# Patient Record
Sex: FEMALE | Race: WHITE | Employment: OTHER | URBAN - METROPOLITAN AREA
[De-identification: names, ages, dates, MRNs, and addresses within clinical notes are randomized per-mention and may not be internally consistent; named-entity substitution may affect disease eponyms.]

---

## 2017-12-06 ENCOUNTER — OFFICE VISIT (OUTPATIENT)
Dept: ENDOCRINOLOGY | Age: 62
End: 2017-12-06

## 2017-12-06 VITALS
DIASTOLIC BLOOD PRESSURE: 64 MMHG | SYSTOLIC BLOOD PRESSURE: 113 MMHG | WEIGHT: 199 LBS | BODY MASS INDEX: 30.16 KG/M2 | HEIGHT: 68 IN | HEART RATE: 64 BPM

## 2017-12-06 DIAGNOSIS — E55.9 VITAMIN D INSUFFICIENCY: ICD-10-CM

## 2017-12-06 DIAGNOSIS — R53.83 OTHER FATIGUE: ICD-10-CM

## 2017-12-06 DIAGNOSIS — E03.9 ACQUIRED HYPOTHYROIDISM: Primary | ICD-10-CM

## 2017-12-06 DIAGNOSIS — E03.9 ACQUIRED HYPOTHYROIDISM: ICD-10-CM

## 2017-12-06 LAB
PARATHYROID HORMONE INTACT: 67.9 PG/ML (ref 14–72)
TSH REFLEX: 0.55 UIU/ML (ref 0.27–4.2)
VITAMIN B-12: 248 PG/ML (ref 211–911)
VITAMIN D 25-HYDROXY: 25.4 NG/ML

## 2017-12-06 PROCEDURE — 1036F TOBACCO NON-USER: CPT | Performed by: INTERNAL MEDICINE

## 2017-12-06 PROCEDURE — 99204 OFFICE O/P NEW MOD 45 MIN: CPT | Performed by: INTERNAL MEDICINE

## 2017-12-06 PROCEDURE — G8484 FLU IMMUNIZE NO ADMIN: HCPCS | Performed by: INTERNAL MEDICINE

## 2017-12-06 PROCEDURE — G8427 DOCREV CUR MEDS BY ELIG CLIN: HCPCS | Performed by: INTERNAL MEDICINE

## 2017-12-06 PROCEDURE — 3017F COLORECTAL CA SCREEN DOC REV: CPT | Performed by: INTERNAL MEDICINE

## 2017-12-06 PROCEDURE — G8417 CALC BMI ABV UP PARAM F/U: HCPCS | Performed by: INTERNAL MEDICINE

## 2017-12-06 PROCEDURE — 3014F SCREEN MAMMO DOC REV: CPT | Performed by: INTERNAL MEDICINE

## 2017-12-06 RX ORDER — BUDESONIDE AND FORMOTEROL FUMARATE DIHYDRATE 160; 4.5 UG/1; UG/1
2 AEROSOL RESPIRATORY (INHALATION) PRN
Status: ON HOLD | COMMUNITY
End: 2019-05-01

## 2017-12-06 RX ORDER — CELECOXIB 200 MG/1
CAPSULE ORAL
Refills: 3 | COMMUNITY
Start: 2017-11-08

## 2017-12-06 RX ORDER — LEVOTHYROXINE SODIUM 112 UG/1
TABLET ORAL
Refills: 3 | COMMUNITY
Start: 2017-11-08

## 2017-12-06 RX ORDER — ESTRADIOL 0.5 MG/1
TABLET ORAL
Refills: 13 | COMMUNITY
Start: 2017-11-13 | End: 2021-05-23

## 2017-12-07 DIAGNOSIS — E03.9 HYPOTHYROIDISM (ACQUIRED): Primary | ICD-10-CM

## 2017-12-08 ENCOUNTER — TELEPHONE (OUTPATIENT)
Dept: ENDOCRINOLOGY | Age: 62
End: 2017-12-08

## 2017-12-08 NOTE — TELEPHONE ENCOUNTER
Spoke with pt and let her know Dr. Meghan Talbot is waiting for all results to come back to give results. Pt understood. Thanks!

## 2017-12-09 LAB — IGA: 296 MG/DL (ref 68–408)

## 2017-12-10 LAB — TISSUE TRANSGLUTAMINASE IGA: 1 U/ML (ref 0–3)

## 2017-12-11 ENCOUNTER — TELEPHONE (OUTPATIENT)
Dept: ENDOCRINOLOGY | Age: 62
End: 2017-12-11

## 2017-12-11 RX ORDER — ERGOCALCIFEROL (VITAMIN D2) 1250 MCG
50000 CAPSULE ORAL WEEKLY
Qty: 12 CAPSULE | Refills: 3 | Status: SHIPPED | OUTPATIENT
Start: 2017-12-11 | End: 2021-05-23

## 2017-12-11 NOTE — TELEPHONE ENCOUNTER
Patient just missed someone's call regarding test results. She is home now and will be the rest of the day and would like someone to call her back please.

## 2017-12-11 NOTE — TELEPHONE ENCOUNTER
Called and left message for patient to call and get lab results        Please advise her the vitamin D was low, I am restarting 50,000 IU once a week. Rest of her test were normal

## 2019-04-29 NOTE — PROGRESS NOTES
PRE OP INSTRUCTION SHEET   1. Do not eat or drink anything after 12 midnight  prior to surgery. This includes no water, chewing gum or mints. 2. Take the following pills will a small sip of water (see MAR)                                        3. Aspirin, Ibuprofen, Advil, Naproxen, Vitamin E, fish oil and other Anti-inflammatory products should be stopped for 5 days before surgery or as directed by your physician. 4. Check with your Doctor regarding stopping Plavix, Coumadin, Lovenox, Fragmin or other blood thinners   5. Do not smoke, and do not drink any alcoholic beverages 24 hours prior to surgery. This includes NA Beer. 6. You may brush your teeth and gargle the morning of surgery. DO NOT SWALLOW WATER   7. You MUST make arrangements for a responsible adult to take you home after your surgery. You will not be allowed to leave alone or drive yourself home. It is strongly suggested someone stay with you the first 24 hrs. Your surgery will be cancelled if you do not have a ride home. 8. A parent/legal guardian must accompany a child scheduled for surgery and plan to stay at the hospital until the child is discharged. Please do not bring other children with you. 9. Please wear simple, loose fitting clothing to the hospital.  Milka Ramosyle not bring valuables (money, credit cards, checkbooks, etc.) Do not wear any makeup (including no eye makeup) or nail polish on your fingers or toes. 10. DO NOT wear any jewelry or piercings on day of surgery. All body piercing jewelry must be removed. 11. If you have dentures,glasses, or contacts they will be removed before going to the OR; we will provide you a container. 12. Please see your family doctor/and cardiologist for a history & physical and/or concerning medications. Bring any test results/reports from your physician's office. Have history and labs faxed to [phone].  Remember to bring Blood Bank bracelet on the day of surgery. 14. If you have a Living Will and Durable Power of  for Healthcare, please bring in a copy. 13. Notify your Surgeon if you develop any illness between now and surgery  time, cough, cold, fever, sore throat, nausea, vomiting, etc.  Please notify your surgeon if you experience dizziness, shortness of breath or blurred vision between now & the time of your surgery   16. DO NOT shave your operative site 96 hours prior to surgery. For face & neck surgery, men may use an electric razor 48 hours prior to surgery. 17. Shower with _x__Antibacterial soap (x_chlorhexidine for total joint  Pt's) shower two times before surgery.(the morning of and the night before. 18. To provide excellent care visitors will be limited to one in the room at any given time.   Please call pre admission testing if you any further questions [phone] or [phone]

## 2019-04-30 ENCOUNTER — ANESTHESIA EVENT (OUTPATIENT)
Dept: OPERATING ROOM | Age: 64
End: 2019-04-30
Payer: MEDICARE

## 2019-05-01 ENCOUNTER — HOSPITAL ENCOUNTER (OUTPATIENT)
Age: 64
Setting detail: OUTPATIENT SURGERY
Discharge: HOME | End: 2019-05-01
Attending: UROLOGY | Admitting: UROLOGY
Payer: MEDICARE

## 2019-05-01 ENCOUNTER — ANESTHESIA (OUTPATIENT)
Dept: OPERATING ROOM | Age: 64
End: 2019-05-01
Payer: MEDICARE

## 2019-05-01 VITALS
SYSTOLIC BLOOD PRESSURE: 112 MMHG | DIASTOLIC BLOOD PRESSURE: 73 MMHG | OXYGEN SATURATION: 92 % | RESPIRATION RATE: 14 BRPM

## 2019-05-01 VITALS
TEMPERATURE: 97.3 F | OXYGEN SATURATION: 96 % | DIASTOLIC BLOOD PRESSURE: 82 MMHG | HEIGHT: 68 IN | WEIGHT: 210 LBS | SYSTOLIC BLOOD PRESSURE: 119 MMHG | BODY MASS INDEX: 31.83 KG/M2 | HEART RATE: 72 BPM | RESPIRATION RATE: 16 BRPM

## 2019-05-01 PROCEDURE — 6360000002 HC RX W HCPCS: Performed by: NURSE ANESTHETIST, CERTIFIED REGISTERED

## 2019-05-01 PROCEDURE — 2580000003 HC RX 258: Performed by: ANESTHESIOLOGY

## 2019-05-01 PROCEDURE — 7100000010 HC PHASE II RECOVERY - FIRST 15 MIN: Performed by: UROLOGY

## 2019-05-01 PROCEDURE — 2580000003 HC RX 258: Performed by: UROLOGY

## 2019-05-01 PROCEDURE — 7100000011 HC PHASE II RECOVERY - ADDTL 15 MIN: Performed by: UROLOGY

## 2019-05-01 PROCEDURE — 3600000004 HC SURGERY LEVEL 4 BASE: Performed by: UROLOGY

## 2019-05-01 PROCEDURE — 2709999900 HC NON-CHARGEABLE SUPPLY: Performed by: UROLOGY

## 2019-05-01 PROCEDURE — 6370000000 HC RX 637 (ALT 250 FOR IP): Performed by: UROLOGY

## 2019-05-01 PROCEDURE — 3700000000 HC ANESTHESIA ATTENDED CARE: Performed by: UROLOGY

## 2019-05-01 PROCEDURE — 2500000003 HC RX 250 WO HCPCS: Performed by: ANESTHESIOLOGY

## 2019-05-01 PROCEDURE — 6360000002 HC RX W HCPCS: Performed by: UROLOGY

## 2019-05-01 RX ORDER — MAGNESIUM HYDROXIDE 1200 MG/15ML
LIQUID ORAL PRN
Status: DISCONTINUED | OUTPATIENT
Start: 2019-05-01 | End: 2019-05-01 | Stop reason: ALTCHOICE

## 2019-05-01 RX ORDER — SODIUM CHLORIDE 0.9 % (FLUSH) 0.9 %
10 SYRINGE (ML) INJECTION PRN
Status: DISCONTINUED | OUTPATIENT
Start: 2019-05-01 | End: 2019-05-01 | Stop reason: HOSPADM

## 2019-05-01 RX ORDER — MEPERIDINE HYDROCHLORIDE 25 MG/ML
12.5 INJECTION INTRAMUSCULAR; INTRAVENOUS; SUBCUTANEOUS EVERY 5 MIN PRN
Status: DISCONTINUED | OUTPATIENT
Start: 2019-05-01 | End: 2019-05-01 | Stop reason: HOSPADM

## 2019-05-01 RX ORDER — FENTANYL CITRATE 50 UG/ML
INJECTION, SOLUTION INTRAMUSCULAR; INTRAVENOUS PRN
Status: DISCONTINUED | OUTPATIENT
Start: 2019-05-01 | End: 2019-05-01 | Stop reason: SDUPTHER

## 2019-05-01 RX ORDER — HYDROMORPHONE HCL 110MG/55ML
0.5 PATIENT CONTROLLED ANALGESIA SYRINGE INTRAVENOUS EVERY 5 MIN PRN
Status: DISCONTINUED | OUTPATIENT
Start: 2019-05-01 | End: 2019-05-01 | Stop reason: HOSPADM

## 2019-05-01 RX ORDER — OXYCODONE HYDROCHLORIDE AND ACETAMINOPHEN 5; 325 MG/1; MG/1
1 TABLET ORAL PRN
Status: DISCONTINUED | OUTPATIENT
Start: 2019-05-01 | End: 2019-05-01 | Stop reason: HOSPADM

## 2019-05-01 RX ORDER — PROPOFOL 10 MG/ML
INJECTION, EMULSION INTRAVENOUS PRN
Status: DISCONTINUED | OUTPATIENT
Start: 2019-05-01 | End: 2019-05-01 | Stop reason: SDUPTHER

## 2019-05-01 RX ORDER — OXYCODONE HYDROCHLORIDE AND ACETAMINOPHEN 5; 325 MG/1; MG/1
2 TABLET ORAL PRN
Status: DISCONTINUED | OUTPATIENT
Start: 2019-05-01 | End: 2019-05-01 | Stop reason: HOSPADM

## 2019-05-01 RX ORDER — SODIUM CHLORIDE, SODIUM LACTATE, POTASSIUM CHLORIDE, CALCIUM CHLORIDE 600; 310; 30; 20 MG/100ML; MG/100ML; MG/100ML; MG/100ML
INJECTION, SOLUTION INTRAVENOUS CONTINUOUS
Status: DISCONTINUED | OUTPATIENT
Start: 2019-05-01 | End: 2019-05-01 | Stop reason: HOSPADM

## 2019-05-01 RX ORDER — HYDROMORPHONE HCL 110MG/55ML
0.5 PATIENT CONTROLLED ANALGESIA SYRINGE INTRAVENOUS EVERY 10 MIN PRN
Status: DISCONTINUED | OUTPATIENT
Start: 2019-05-01 | End: 2019-05-01 | Stop reason: HOSPADM

## 2019-05-01 RX ORDER — HYDRALAZINE HYDROCHLORIDE 20 MG/ML
5 INJECTION INTRAMUSCULAR; INTRAVENOUS EVERY 30 MIN PRN
Status: DISCONTINUED | OUTPATIENT
Start: 2019-05-01 | End: 2019-05-01 | Stop reason: HOSPADM

## 2019-05-01 RX ORDER — DIPHENHYDRAMINE HYDROCHLORIDE 50 MG/ML
6.25 INJECTION INTRAMUSCULAR; INTRAVENOUS
Status: DISCONTINUED | OUTPATIENT
Start: 2019-05-01 | End: 2019-05-01 | Stop reason: HOSPADM

## 2019-05-01 RX ORDER — LIDOCAINE HYDROCHLORIDE 20 MG/ML
JELLY TOPICAL PRN
Status: DISCONTINUED | OUTPATIENT
Start: 2019-05-01 | End: 2019-05-01 | Stop reason: ALTCHOICE

## 2019-05-01 RX ORDER — ONDANSETRON 2 MG/ML
4 INJECTION INTRAMUSCULAR; INTRAVENOUS EVERY 30 MIN PRN
Status: DISCONTINUED | OUTPATIENT
Start: 2019-05-01 | End: 2019-05-01 | Stop reason: HOSPADM

## 2019-05-01 RX ORDER — SODIUM CHLORIDE 0.9 % (FLUSH) 0.9 %
10 SYRINGE (ML) INJECTION EVERY 12 HOURS SCHEDULED
Status: DISCONTINUED | OUTPATIENT
Start: 2019-05-01 | End: 2019-05-01 | Stop reason: HOSPADM

## 2019-05-01 RX ORDER — GENTAMICIN SULFATE 80 MG/100ML
80 INJECTION, SOLUTION INTRAVENOUS ONCE
Status: COMPLETED | OUTPATIENT
Start: 2019-05-01 | End: 2019-05-01

## 2019-05-01 RX ORDER — CIPROFLOXACIN 250 MG/1
250 TABLET, FILM COATED ORAL 2 TIMES DAILY
Qty: 10 TABLET | Refills: 0 | Status: SHIPPED | OUTPATIENT
Start: 2019-05-01 | End: 2019-05-06

## 2019-05-01 RX ORDER — PHENAZOPYRIDINE HYDROCHLORIDE 200 MG/1
200 TABLET, FILM COATED ORAL 3 TIMES DAILY PRN
Qty: 15 TABLET | Refills: 0 | Status: SHIPPED | OUTPATIENT
Start: 2019-05-01 | End: 2019-05-06

## 2019-05-01 RX ORDER — LIDOCAINE HYDROCHLORIDE 10 MG/ML
1 INJECTION, SOLUTION EPIDURAL; INFILTRATION; INTRACAUDAL; PERINEURAL
Status: COMPLETED | OUTPATIENT
Start: 2019-05-01 | End: 2019-05-01

## 2019-05-01 RX ORDER — LABETALOL HYDROCHLORIDE 5 MG/ML
5 INJECTION, SOLUTION INTRAVENOUS
Status: DISCONTINUED | OUTPATIENT
Start: 2019-05-01 | End: 2019-05-01 | Stop reason: HOSPADM

## 2019-05-01 RX ADMIN — PROPOFOL 100 MG: 10 INJECTION, EMULSION INTRAVENOUS at 15:03

## 2019-05-01 RX ADMIN — SODIUM CHLORIDE, POTASSIUM CHLORIDE, SODIUM LACTATE AND CALCIUM CHLORIDE: 600; 310; 30; 20 INJECTION, SOLUTION INTRAVENOUS at 14:32

## 2019-05-01 RX ADMIN — LIDOCAINE HYDROCHLORIDE 1 ML: 10 INJECTION, SOLUTION EPIDURAL; INFILTRATION; INTRACAUDAL; PERINEURAL at 14:32

## 2019-05-01 RX ADMIN — GENTAMICIN SULFATE 80 MG: 80 INJECTION, SOLUTION INTRAVENOUS at 14:34

## 2019-05-01 RX ADMIN — FENTANYL CITRATE 100 MCG: 50 INJECTION INTRAMUSCULAR; INTRAVENOUS at 15:02

## 2019-05-01 ASSESSMENT — PULMONARY FUNCTION TESTS
PIF_VALUE: 0

## 2019-05-01 ASSESSMENT — PAIN - FUNCTIONAL ASSESSMENT: PAIN_FUNCTIONAL_ASSESSMENT: 0-10

## 2019-05-01 ASSESSMENT — PAIN SCALES - GENERAL
PAINLEVEL_OUTOF10: 0
PAINLEVEL_OUTOF10: 0

## 2019-05-01 NOTE — ANESTHESIA PRE PROCEDURE
Department of Anesthesiology  Preprocedure Note       Name:  Susannah Romero   Age:  75 y.o.  :  1955                                          MRN:  [id number]         Date:  2019      Surgeon: Louis Prescott):  Earline King MD    Procedure: CYSTOSCOPY, POSSIBLE BLADDER BIOPSY, POSSIBLE URETHRAL DILATATION (N/A Bladder)    Medications prior to admission:   Prior to Admission medications    Medication Sig Start Date End Date Taking?  Authorizing Provider   ergocalciferol (DRISDOL) 58722 units capsule Take 1 capsule by mouth once a week 17 Yes Brett Murguia MD   celecoxib (CELEBREX) 200 MG capsule  17  Yes Historical Provider, MD   levothyroxine (SYNTHROID) 112 MCG tablet  17  Yes Historical Provider, MD   estradiol (ESTRACE) 0.5 MG tablet  17  Yes Historical Provider, MD   budesonide-formoterol (SYMBICORT) 160-4.5 MCG/ACT AERO Inhale 2 puffs into the lungs as needed    Yes Historical Provider, MD       Current medications:    Current Facility-Administered Medications   Medication Dose Route Frequency Provider Last Rate Last Dose    gentamicin (GARAMYCIN) IVPB 80 mg  80 mg Intravenous Once Earline King MD        lactated ringers infusion   Intravenous Continuous Alfa Lockett MD        sodium chloride flush 0.9 % injection 10 mL  10 mL Intravenous 2 times per day Alfa Lockett MD        sodium chloride flush 0.9 % injection 10 mL  10 mL Intravenous PRN Alfa Lockett MD        lidocaine PF 1 % injection 1 mL  1 mL Intradermal Once PRN Alfa Lockett MD        HYDROmorphone (DILAUDID) injection 0.5 mg  0.5 mg Intravenous Q10 Min PRN Karie Schlatter, MD        HYDROmorphone (DILAUDID) injection 0.5 mg  0.5 mg Intravenous Q5 Min PRN Karie Schlatter, MD        oxyCODONE-acetaminophen (PERCOCET) 5-325 MG per tablet 1 tablet  1 tablet Oral PRN Karie Schlatter, MD        Or    oxyCODONE-acetaminophen (PERCOCET) 5-325 MG per tablet 2 tablet  2 tablet Oral PRN Dion Quezada MD        diphenhydrAMINE (BENADRYL) injection 6.25 mg  6.25 mg Intravenous Once PRN Dion Quezada MD        ondansetron [facility]) injection 4 mg  4 mg Intravenous Q30 Min PRN Dion Quezada MD        labetalol (NORMODYNE;TRANDATE) injection 5 mg  5 mg Intravenous Q15 Min PRN Dion Quezada MD        hydrALAZINE (APRESOLINE) injection 5 mg  5 mg Intravenous Q30 Min PRN Dion Quezada MD        meperidine (DEMEROL) injection 12.5 mg  12.5 mg Intravenous Q5 Min PRN Dion Quezada MD           Allergies: Allergies   Allergen Reactions    Keflex [Cephalexin]     Sulfa Antibiotics Hives    Percocet [Oxycodone-Acetaminophen] Nausea And Vomiting     Flu symptoms        Problem List:  There is no problem list on file for this patient. Past Medical History:        Diagnosis Date    Abnormal vision     Arthritis     Asthma     Back ache     Circulation problem     Feet       Past Surgical History:        Procedure Laterality Date    ABDOMINAL WALL SURGERY      EYE SURGERY      FOOT SURGERY      HYSTERECTOMY         Social History:    Social History     Tobacco Use    Smoking status: Never Smoker    Smokeless tobacco: Never Used   Substance Use Topics    Alcohol use: No                                Counseling given: Not Answered      Vital Signs (Current):   Vitals:    04/29/19 1438   Weight: 210 lb (95.3 kg)   Height: 5' 8\" (1.727 m)                                              BP Readings from Last 3 Encounters:   12/06/17 113/64       NPO Status:                                                                                 BMI:   Wt Readings from Last 3 Encounters:   04/29/19 210 lb (95.3 kg)   12/06/17 199 lb (90.3 kg)     Body mass index is 31.93 kg/m².     CBC: No results found for: WBC, RBC, HGB, HCT, MCV, RDW, PLT    CMP: No results found for: NA, K, CL, CO2, BUN, CREATININE, GFRAA, AGRATIO, LABGLOM, GLUCOSE, PROT, CALCIUM, BILITOT, ALKPHOS, AST, ALT    POC Tests: No results for input(s): POCGLU, POCNA, POCK, POCCL, POCBUN, POCHEMO, POCHCT in the last 72 hours. Coags: No results found for: PROTIME, INR, APTT    HCG (If Applicable): No results found for: PREGTESTUR, PREGSERUM, HCG, HCGQUANT     ABGs: No results found for: PHART, PO2ART, XYR9GPM, BAS8IPN, BEART, R3CKLHQV     Type & Screen (If Applicable):  No results found for: LABABO, [address]    Anesthesia Evaluation  Patient summary reviewed and Nursing notes reviewed no history of anesthetic complications:   Airway: Mallampati: II     Neck ROM: full   Dental:          Pulmonary:   (+) asthma:                            Cardiovascular:                      Neuro/Psych:               GI/Hepatic/Renal:            ROS comment: obesity. Endo/Other:                     Abdominal:           Vascular:                                        Anesthesia Plan      general     ASA 2       Induction: intravenous. Anesthetic plan and risks discussed with patient. Plan discussed with CRNA.                   Mitchell Reed MD   5/1/2019

## 2019-05-01 NOTE — PROGRESS NOTES
Report from [address] [place] [address] Rd and CRNA. Pt arrived to postop from OR. See doc flow for vitals and assessment. Will monitor.

## 2019-05-02 NOTE — OP NOTE
[address]                 [address] [address]                                OPERATIVE REPORT    PATIENT NAME: Maria C Hill                      :        1955  MED REC NO:   [id number]                          ROOM:  ACCOUNT NO:   [de-identified]                           ADMIT DATE: 2019  PROVIDER:     Berny Escobar MD    DATE OF PROCEDURE:  2019    PREOPERATIVE DIAGNOSES:  1. Outlet obstructive symptoms. 2.  Chronic cystitis. 3.  Mixed incontinence. POSTOPERATIVE DIAGNOSES:  1. Outlet obstructive symptoms. 2.  Chronic cystitis. 3.  Mixed incontinence. OPERATION PERFORMED:  Cystoscopy with urethral dilation. PRIMARY SURGEON:  Berny Escobar MD    ANESTHESIA:  General.    OPERATIVE FINDINGS:  Urethral stenosis. HISTORY:  This is a 41-year-old white female with a history of  persistent microscopic hematuria as well as some voiding difficulty. To  rule out bladder pathology, she was scheduled for a cystourethroscopy. Risks, benefits, and expected outcomes have been discussed. DETAILS OF THE PROCEDURE:  After obtaining informed consent, the patient  was taken to the operative suite. She was given a general anesthetic  and placed on table in a modified dorsal lithotomy position. Prepping  and draping was done in sterile fashion. Cystourethroscopy was then  performed with both 30 and 70-degree lenses. Panendoscopy was done  showing no evidence of any bladder cancer, tumors, or stones. Both  ureteral orifices were orthotopic with clear efflux. Mild trabeculation  was seen as well. There was urethral stenosis and outlet obstruction. Dilation was then done with Sharlon Moroccan sounds to [address]. She tolerated  the procedure well. After draining her bladder, lidocaine jelly was  applied and she was taken back to the postop recovery room in stable  condition.         Andrzej Garcia MD    D: 2019 9:37:06       T: 05/02/2019 14:09:00     [email or web address]  Job#: [id number]     Doc#: [id number]    CC:

## 2019-05-03 NOTE — ANESTHESIA POSTPROCEDURE EVALUATION
Department of Anesthesiology  Postprocedure Note    Patient: Selam Villarreal  MRN: [id number]  YOB: 1955  Date of evaluation: 5/3/2019  Time:  9:12 AM     Procedure Summary     Date:  05/01/19 Room / Location:  [place] [address] / [facility] OR    Anesthesia Start:  3649 Anesthesia Stop:  3242    Procedure:  CYSTOSCOPY, URETHRAL DILATATION (N/A Bladder) Diagnosis:  (MICROSCOPIC HEMATURIA)    Surgeon:  Alycia Tsai MD Responsible Provider:  Abbie Kamara MD    Anesthesia Type:  general ASA Status:  2          Anesthesia Type: general    Reyes Phase I: Reyes Score: 10    Reyes Phase II: Reyes Score: 10    Last vitals: Reviewed and per EMR flowsheets. Anesthesia Post Evaluation    Comments: Postoperative Anesthesia Note    Name:    Selam Villarreal  MRN:      [id number]    Empty flowsheet group. LABS:    CBC  No results found for: WBC, HGB, HCT, PLT  RENAL  No results found for: NA, K, CL, CO2, BUN, CREATININE, GLUCOSE  COAGS  No results found for: PROTIME, INR, APTT    Intake & Output:  No intake/output data recorded. Nausea & Vomiting:  No    Level of Consciousness:  Awake    Pain Assessment:  Adequate analgesia    Anesthesia Complications:  No apparent anesthetic complications    SUMMARY      Vital signs stable  OK to discharge from Stage I post anesthesia care.   Care transferred from Anesthesiology department on discharge from perioperative area

## 2019-09-25 ENCOUNTER — HOSPITAL ENCOUNTER (OUTPATIENT)
Dept: ULTRASOUND IMAGING | Age: 64
Discharge: HOME | End: 2019-09-25
Payer: MEDICARE

## 2019-09-25 DIAGNOSIS — N39.0 URINARY TRACT INFECTION WITHOUT HEMATURIA, SITE UNSPECIFIED: ICD-10-CM

## 2019-09-25 DIAGNOSIS — R39.14 FEELING OF INCOMPLETE BLADDER EMPTYING: ICD-10-CM

## 2019-09-25 PROCEDURE — 76770 US EXAM ABDO BACK WALL COMP: CPT

## 2021-05-23 ENCOUNTER — APPOINTMENT (OUTPATIENT)
Dept: CT IMAGING | Age: 66
DRG: 694 | End: 2021-05-23
Payer: MEDICARE

## 2021-05-23 ENCOUNTER — HOSPITAL ENCOUNTER (INPATIENT)
Age: 66
LOS: 1 days | Discharge: HOME | DRG: 694 | End: 2021-05-24
Attending: STUDENT IN AN ORGANIZED HEALTH CARE EDUCATION/TRAINING PROGRAM | Admitting: INTERNAL MEDICINE
Payer: MEDICARE

## 2021-05-23 DIAGNOSIS — R10.9 RIGHT FLANK PAIN: Primary | ICD-10-CM

## 2021-05-23 DIAGNOSIS — N13.30 HYDRONEPHROSIS, UNSPECIFIED HYDRONEPHROSIS TYPE: ICD-10-CM

## 2021-05-23 DIAGNOSIS — N20.0 KIDNEY STONE: ICD-10-CM

## 2021-05-23 LAB
A/G RATIO: 1.2 (ref 1.1–2.2)
ALBUMIN SERPL-MCNC: 3.9 G/DL (ref 3.4–5)
ALP BLD-CCNC: 107 U/L (ref 40–129)
ALT SERPL-CCNC: 25 U/L (ref 10–40)
ANION GAP SERPL CALCULATED.3IONS-SCNC: 12 MMOL/L (ref 3–16)
AST SERPL-CCNC: 28 U/L (ref 15–37)
BASOPHILS ABSOLUTE: 0.1 K/UL (ref 0–0.2)
BASOPHILS RELATIVE PERCENT: 1.6 %
BILIRUB SERPL-MCNC: 0.7 MG/DL (ref 0–1)
BILIRUBIN URINE: NEGATIVE
BLOOD, URINE: ABNORMAL
BUN BLDV-MCNC: 15 MG/DL (ref 7–20)
CALCIUM SERPL-MCNC: 9.6 MG/DL (ref 8.3–10.6)
CHLORIDE BLD-SCNC: 107 MMOL/L (ref 99–110)
CLARITY: CLEAR
CO2: 22 MMOL/L (ref 21–32)
COLOR: YELLOW
CREAT SERPL-MCNC: 1 MG/DL (ref 0.6–1.2)
EOSINOPHILS ABSOLUTE: 0.2 K/UL (ref 0–0.6)
EOSINOPHILS RELATIVE PERCENT: 2.9 %
EPITHELIAL CELLS, UA: ABNORMAL /HPF (ref 0–5)
GFR AFRICAN AMERICAN: >60
GFR NON-AFRICAN AMERICAN: 55
GLOBULIN: 3.2 G/DL
GLUCOSE BLD-MCNC: 139 MG/DL (ref 70–99)
GLUCOSE URINE: NEGATIVE MG/DL
HCT VFR BLD CALC: 46.8 % (ref 36–48)
HEMOGLOBIN: 16 G/DL (ref 12–16)
INFLUENZA A: NOT DETECTED
INFLUENZA B: NOT DETECTED
KETONES, URINE: NEGATIVE MG/DL
LACTIC ACID, SEPSIS: 0.7 MMOL/L (ref 0.4–1.9)
LACTIC ACID, SEPSIS: 2.2 MMOL/L (ref 0.4–1.9)
LEUKOCYTE ESTERASE, URINE: NEGATIVE
LYMPHOCYTES ABSOLUTE: 2.3 K/UL (ref 1–5.1)
LYMPHOCYTES RELATIVE PERCENT: 30.5 %
MCH RBC QN AUTO: 31.4 PG (ref 26–34)
MCHC RBC AUTO-ENTMCNC: 34.2 G/DL (ref 31–36)
MCV RBC AUTO: 91.8 FL (ref 80–100)
MICROSCOPIC EXAMINATION: YES
MONOCYTES ABSOLUTE: 0.7 K/UL (ref 0–1.3)
MONOCYTES RELATIVE PERCENT: 9.1 %
MUCUS: ABNORMAL /LPF
NEUTROPHILS ABSOLUTE: 4.3 K/UL (ref 1.7–7.7)
NEUTROPHILS RELATIVE PERCENT: 55.9 %
NITRITE, URINE: NEGATIVE
PDW BLD-RTO: 13.2 % (ref 12.4–15.4)
PH UA: 6 (ref 5–8)
PLATELET # BLD: 307 K/UL (ref 135–450)
PMV BLD AUTO: 9.1 FL (ref 5–10.5)
POTASSIUM REFLEX MAGNESIUM: 4.3 MMOL/L (ref 3.5–5.1)
PROTEIN UA: NEGATIVE MG/DL
RBC # BLD: 5.1 M/UL (ref 4–5.2)
RBC UA: ABNORMAL /HPF (ref 0–4)
SARS-COV-2 RNA, RT PCR: NOT DETECTED
SODIUM BLD-SCNC: 141 MMOL/L (ref 136–145)
SPECIFIC GRAVITY UA: 1.02 (ref 1–1.03)
TOTAL PROTEIN: 7.1 G/DL (ref 6.4–8.2)
URINE TYPE: ABNORMAL
UROBILINOGEN, URINE: 0.2 E.U./DL
WBC # BLD: 7.7 K/UL (ref 4–11)
WBC UA: ABNORMAL /HPF (ref 0–5)

## 2021-05-23 PROCEDURE — 2580000003 HC RX 258: Performed by: STUDENT IN AN ORGANIZED HEALTH CARE EDUCATION/TRAINING PROGRAM

## 2021-05-23 PROCEDURE — 93005 ELECTROCARDIOGRAM TRACING: CPT | Performed by: INTERNAL MEDICINE

## 2021-05-23 PROCEDURE — 96376 TX/PRO/DX INJ SAME DRUG ADON: CPT

## 2021-05-23 PROCEDURE — 83605 ASSAY OF LACTIC ACID: CPT

## 2021-05-23 PROCEDURE — 87086 URINE CULTURE/COLONY COUNT: CPT

## 2021-05-23 PROCEDURE — 81001 URINALYSIS AUTO W/SCOPE: CPT

## 2021-05-23 PROCEDURE — 80053 COMPREHEN METABOLIC PANEL: CPT

## 2021-05-23 PROCEDURE — 96372 THER/PROPH/DIAG INJ SC/IM: CPT

## 2021-05-23 PROCEDURE — 6360000002 HC RX W HCPCS: Performed by: STUDENT IN AN ORGANIZED HEALTH CARE EDUCATION/TRAINING PROGRAM

## 2021-05-23 PROCEDURE — 96374 THER/PROPH/DIAG INJ IV PUSH: CPT

## 2021-05-23 PROCEDURE — 85025 COMPLETE CBC W/AUTO DIFF WBC: CPT

## 2021-05-23 PROCEDURE — 1200000000 HC SEMI PRIVATE

## 2021-05-23 PROCEDURE — 99222 1ST HOSP IP/OBS MODERATE 55: CPT | Performed by: INTERNAL MEDICINE

## 2021-05-23 PROCEDURE — 87636 SARSCOV2 & INF A&B AMP PRB: CPT

## 2021-05-23 PROCEDURE — 2580000003 HC RX 258: Performed by: PHYSICIAN ASSISTANT

## 2021-05-23 PROCEDURE — 6360000002 HC RX W HCPCS: Performed by: PHYSICIAN ASSISTANT

## 2021-05-23 PROCEDURE — 74176 CT ABD & PELVIS W/O CONTRAST: CPT

## 2021-05-23 PROCEDURE — 96375 TX/PRO/DX INJ NEW DRUG ADDON: CPT

## 2021-05-23 PROCEDURE — 99284 EMERGENCY DEPT VISIT MOD MDM: CPT

## 2021-05-23 RX ORDER — PROMETHAZINE HYDROCHLORIDE 25 MG/ML
25 INJECTION, SOLUTION INTRAMUSCULAR; INTRAVENOUS ONCE
Status: COMPLETED | OUTPATIENT
Start: 2021-05-23 | End: 2021-05-23

## 2021-05-23 RX ORDER — CIPROFLOXACIN 2 MG/ML
400 INJECTION, SOLUTION INTRAVENOUS EVERY 12 HOURS
Status: DISCONTINUED | OUTPATIENT
Start: 2021-05-24 | End: 2021-05-24 | Stop reason: HOSPADM

## 2021-05-23 RX ORDER — SODIUM CHLORIDE 0.9 % (FLUSH) 0.9 %
5-40 SYRINGE (ML) INJECTION EVERY 12 HOURS SCHEDULED
Status: DISCONTINUED | OUTPATIENT
Start: 2021-05-23 | End: 2021-05-24 | Stop reason: HOSPADM

## 2021-05-23 RX ORDER — MORPHINE SULFATE 4 MG/ML
4 INJECTION, SOLUTION INTRAMUSCULAR; INTRAVENOUS ONCE
Status: COMPLETED | OUTPATIENT
Start: 2021-05-23 | End: 2021-05-23

## 2021-05-23 RX ORDER — HYDROCODONE BITARTRATE AND ACETAMINOPHEN 5; 325 MG/1; MG/1
2 TABLET ORAL EVERY 4 HOURS PRN
Status: DISCONTINUED | OUTPATIENT
Start: 2021-05-23 | End: 2021-05-24 | Stop reason: HOSPADM

## 2021-05-23 RX ORDER — SODIUM CHLORIDE 0.9 % (FLUSH) 0.9 %
5-40 SYRINGE (ML) INJECTION PRN
Status: DISCONTINUED | OUTPATIENT
Start: 2021-05-23 | End: 2021-05-24 | Stop reason: HOSPADM

## 2021-05-23 RX ORDER — NITROFURANTOIN 25; 75 MG/1; MG/1
50 CAPSULE ORAL DAILY
Status: ON HOLD | COMMUNITY
End: 2021-05-24 | Stop reason: SDUPTHER

## 2021-05-23 RX ORDER — MORPHINE SULFATE 2 MG/ML
2 INJECTION, SOLUTION INTRAMUSCULAR; INTRAVENOUS
Status: DISCONTINUED | OUTPATIENT
Start: 2021-05-23 | End: 2021-05-24 | Stop reason: HOSPADM

## 2021-05-23 RX ORDER — ONDANSETRON 2 MG/ML
4 INJECTION INTRAMUSCULAR; INTRAVENOUS EVERY 6 HOURS PRN
Status: DISCONTINUED | OUTPATIENT
Start: 2021-05-23 | End: 2021-05-23

## 2021-05-23 RX ORDER — PROMETHAZINE HYDROCHLORIDE 25 MG/1
12.5 TABLET ORAL EVERY 6 HOURS PRN
Status: DISCONTINUED | OUTPATIENT
Start: 2021-05-23 | End: 2021-05-24 | Stop reason: HOSPADM

## 2021-05-23 RX ORDER — POLYETHYLENE GLYCOL 3350 17 G/17G
17 POWDER, FOR SOLUTION ORAL DAILY PRN
Status: DISCONTINUED | OUTPATIENT
Start: 2021-05-23 | End: 2021-05-24 | Stop reason: HOSPADM

## 2021-05-23 RX ORDER — ONDANSETRON 2 MG/ML
4 INJECTION INTRAMUSCULAR; INTRAVENOUS EVERY 6 HOURS PRN
Status: DISCONTINUED | OUTPATIENT
Start: 2021-05-23 | End: 2021-05-24 | Stop reason: HOSPADM

## 2021-05-23 RX ORDER — MORPHINE SULFATE 4 MG/ML
4 INJECTION, SOLUTION INTRAMUSCULAR; INTRAVENOUS
Status: DISCONTINUED | OUTPATIENT
Start: 2021-05-23 | End: 2021-05-24 | Stop reason: HOSPADM

## 2021-05-23 RX ORDER — 0.9 % SODIUM CHLORIDE 0.9 %
1000 INTRAVENOUS SOLUTION INTRAVENOUS ONCE
Status: COMPLETED | OUTPATIENT
Start: 2021-05-23 | End: 2021-05-23

## 2021-05-23 RX ORDER — SODIUM CHLORIDE 9 MG/ML
25 INJECTION, SOLUTION INTRAVENOUS PRN
Status: DISCONTINUED | OUTPATIENT
Start: 2021-05-23 | End: 2021-05-24 | Stop reason: HOSPADM

## 2021-05-23 RX ORDER — ACETAMINOPHEN 325 MG/1
650 TABLET ORAL EVERY 4 HOURS PRN
Status: DISCONTINUED | OUTPATIENT
Start: 2021-05-23 | End: 2021-05-24 | Stop reason: HOSPADM

## 2021-05-23 RX ORDER — CIPROFLOXACIN 2 MG/ML
400 INJECTION, SOLUTION INTRAVENOUS ONCE
Status: COMPLETED | OUTPATIENT
Start: 2021-05-23 | End: 2021-05-23

## 2021-05-23 RX ORDER — KETOROLAC TROMETHAMINE 30 MG/ML
15 INJECTION, SOLUTION INTRAMUSCULAR; INTRAVENOUS ONCE
Status: COMPLETED | OUTPATIENT
Start: 2021-05-23 | End: 2021-05-23

## 2021-05-23 RX ORDER — LEVOTHYROXINE SODIUM 112 UG/1
112 TABLET ORAL DAILY
Status: DISCONTINUED | OUTPATIENT
Start: 2021-05-24 | End: 2021-05-24 | Stop reason: HOSPADM

## 2021-05-23 RX ORDER — HYDROCODONE BITARTRATE AND ACETAMINOPHEN 5; 325 MG/1; MG/1
1 TABLET ORAL EVERY 4 HOURS PRN
Status: DISCONTINUED | OUTPATIENT
Start: 2021-05-23 | End: 2021-05-24 | Stop reason: HOSPADM

## 2021-05-23 RX ORDER — SODIUM CHLORIDE 9 MG/ML
INJECTION, SOLUTION INTRAVENOUS CONTINUOUS
Status: DISCONTINUED | OUTPATIENT
Start: 2021-05-23 | End: 2021-05-24 | Stop reason: HOSPADM

## 2021-05-23 RX ORDER — ONDANSETRON 2 MG/ML
4 INJECTION INTRAMUSCULAR; INTRAVENOUS ONCE
Status: COMPLETED | OUTPATIENT
Start: 2021-05-23 | End: 2021-05-23

## 2021-05-23 RX ADMIN — HYDROMORPHONE HYDROCHLORIDE 0.5 MG: 1 INJECTION, SOLUTION INTRAMUSCULAR; INTRAVENOUS; SUBCUTANEOUS at 10:36

## 2021-05-23 RX ADMIN — CIPROFLOXACIN 400 MG: 2 INJECTION, SOLUTION INTRAVENOUS at 14:03

## 2021-05-23 RX ADMIN — SODIUM CHLORIDE 1000 ML: 9 INJECTION, SOLUTION INTRAVENOUS at 09:49

## 2021-05-23 RX ADMIN — SODIUM CHLORIDE: 9 INJECTION, SOLUTION INTRAVENOUS at 23:10

## 2021-05-23 RX ADMIN — HYDROMORPHONE HYDROCHLORIDE 0.5 MG: 1 INJECTION, SOLUTION INTRAMUSCULAR; INTRAVENOUS; SUBCUTANEOUS at 12:11

## 2021-05-23 RX ADMIN — KETOROLAC TROMETHAMINE 15 MG: 30 INJECTION, SOLUTION INTRAMUSCULAR; INTRAVENOUS at 10:03

## 2021-05-23 RX ADMIN — SODIUM CHLORIDE 1000 ML: 9 INJECTION, SOLUTION INTRAVENOUS at 12:27

## 2021-05-23 RX ADMIN — ONDANSETRON HYDROCHLORIDE 4 MG: 2 INJECTION, SOLUTION INTRAMUSCULAR; INTRAVENOUS at 12:10

## 2021-05-23 RX ADMIN — MORPHINE SULFATE 4 MG: 4 INJECTION, SOLUTION INTRAMUSCULAR; INTRAVENOUS at 09:49

## 2021-05-23 RX ADMIN — SODIUM CHLORIDE: 9 INJECTION, SOLUTION INTRAVENOUS at 16:09

## 2021-05-23 RX ADMIN — PROMETHAZINE HYDROCHLORIDE 25 MG: 25 INJECTION INTRAMUSCULAR; INTRAVENOUS at 10:36

## 2021-05-23 RX ADMIN — ONDANSETRON HYDROCHLORIDE 4 MG: 2 INJECTION, SOLUTION INTRAMUSCULAR; INTRAVENOUS at 09:48

## 2021-05-23 RX ADMIN — ENOXAPARIN SODIUM 40 MG: 40 INJECTION SUBCUTANEOUS at 17:00

## 2021-05-23 ASSESSMENT — ENCOUNTER SYMPTOMS
SHORTNESS OF BREATH: 0
NAUSEA: 1
BACK PAIN: 0
RHINORRHEA: 0
VOMITING: 1
COUGH: 0
SORE THROAT: 0
ABDOMINAL PAIN: 1

## 2021-05-23 ASSESSMENT — PAIN SCALES - GENERAL
PAINLEVEL_OUTOF10: 10

## 2021-05-23 NOTE — ED PROVIDER NOTES
[address] ED  EMERGENCY DEPARTMENT ENCOUNTER      Pt Name: Sarina Jackson  MRN: [id number]  [place] 1955  Date of evaluation: 5/23/2021  Provider: Seven White, [address] [place] [address]       Chief Complaint   Patient presents with    Flank Pain     c/o right flank pain that radiates to abdomen. Patient has hx of uti, bladder infections and kidney stones. HISTORY OF PRESENT ILLNESS   (Location/Symptom, Timing/Onset, Context/Setting, Quality, Duration, Modifying Factors, Severity)  Note limiting factors. Sarina Jackson is a 74 y.o. female who presents to the emergency department complaining of right-sided flank pain. Patient presenting with several hours of right-sided flank pain with radiation to the right lower quadrant region. History of kidney stones feels similar to patient. Pain has been waxing and waning since onset. Arrives complaining of pain to the right flank nausea and actively vomiting. States that when she got here she felt feverish, began to vomit. Currently being treated with Macrobid for history of chronic UTI receiving antibiotics for UTI prophylaxis at this time. Denies hematuria. Denies chest pain, shortness of breath. Reports history of diverticulitis however does not feel that this is similar presentation. Nursing Notes were reviewed.     PAST MEDICAL HISTORY     Past Medical History:   Diagnosis Date    Abnormal vision     Arthritis     Asthma     Back ache     Circulation problem     Feet    Thyroid disease          SURGICAL HISTORY       Past Surgical History:   Procedure Laterality Date    ABDOMINAL WALL SURGERY      CYSTOSCOPY N/A 5/1/2019    CYSTOSCOPY, URETHRAL DILATATION performed by Adi Phan MD at [address] [place] [address]      HIP SURGERY Right 08/2018    HYSTERECTOMY           CURRENT MEDICATIONS       Previous Medications    CELECOXIB (CELEBREX) 200 MG CAPSULE        LEVOTHYROXINE (SYNTHROID) 112 MCG TABLET           ALLERGIES     Keflex [cephalexin], Sulfa antibiotics, and Percocet [oxycodone-acetaminophen]    FAMILY HISTORY       Family History   Problem Relation Age of Onset    Lung Cancer Brother     Other Brother         Bowel Disease and Heart     Other Brother         Passed while sleeping     Liver Disease Brother           SOCIAL HISTORY       Social History     Socioeconomic History    Marital status:      Spouse name: None    Number of children: None    Years of education: None    Highest education level: None   Occupational History    None   Tobacco Use    Smoking status: Never Smoker    Smokeless tobacco: Never Used   Vaping Use    Vaping Use: Never assessed   Substance and Sexual Activity    Alcohol use: No    Drug use: No    Sexual activity: Never     Partners: Male     Comment: Divorce    Other Topics Concern    None   Social History Narrative    None     Social Determinants of Health     Financial Resource Strain:     Difficulty of Paying Living Expenses:    Food Insecurity:     Worried About Running Out of Food in the Last Year:     Ran Out of Food in the Last Year:    Transportation Needs:     Lack of Transportation (Medical):      Lack of Transportation (Non-Medical):    Physical Activity:     Days of Exercise per Week:     Minutes of Exercise per Session:    Stress:     Feeling of Stress :    Social Connections:     Frequency of Communication with Friends and Family:     Frequency of Social Gatherings with Friends and Family:     Attends Scientologist Services:     Active Member of Clubs or Organizations:     Attends Club or Organization Meetings:     Marital Status:    Intimate Partner Violence:     Fear of Current or Ex-Partner:     Emotionally Abused:     Physically Abused:     Sexually Abused:        SCREENINGS        James Coma Scale  Eye Opening: Spontaneous  Best Verbal Response: Oriented  Best Motor Response: Obeys commands  Parker Coma Scale Score: 15                   REVIEW OF SYSTEMS    (2-9 systems for level 4, 10 or more for level 5)   Review of Systems   Constitutional: Negative for chills and fever. HENT: Negative for congestion, rhinorrhea and sore throat. Respiratory: Negative for cough and shortness of breath. Cardiovascular: Negative for chest pain, palpitations and leg swelling. Gastrointestinal: Positive for abdominal pain, nausea and vomiting. Genitourinary: Positive for flank pain. Negative for decreased urine volume and hematuria. Musculoskeletal: Negative for back pain, neck pain and neck stiffness. Skin: Negative for rash. Allergic/Immunologic: Positive for environmental allergies. Hematological: Negative for adenopathy. Psychiatric/Behavioral: Negative for confusion. PHYSICAL EXAM    (up to 7 for level 4, 8 or more for level 5)     ED Triage Vitals   BP Temp Temp src Pulse Resp SpO2 Height Weight   -- -- -- -- -- -- -- --       Physical Exam  Constitutional:       General: She is not in acute distress. Appearance: She is not diaphoretic. HENT:      Head: Normocephalic and atraumatic. Eyes:      Pupils: Pupils are equal, round, and reactive to light. Neck:      Trachea: No tracheal deviation. Cardiovascular:      Rate and Rhythm: Normal rate and regular rhythm. Pulmonary:      Effort: Pulmonary effort is normal. No respiratory distress. Abdominal:      General: There is no distension. Palpations: Abdomen is soft. Tenderness: There is abdominal tenderness. Comments: Tenderness right flank right lower quadrant. Musculoskeletal:         General: Normal range of motion. Cervical back: Normal range of motion and neck supple. Skin:     General: Skin is warm. Neurological:      Mental Status: She is oriented to person, place, and time.          DIAGNOSTIC RESULTS     EKG: All EKG's are interpreted by the Emergency Department Physician who either signs or Co-signs this chart in the absence of a cardiologist.        RADIOLOGY:   Non-plain film images such as CT, Ultrasound and MRI are read by the radiologist. Plain radiographic images are visualized and preliminarily interpreted by the emergency physician. Interpretation per the Radiologist below, if available at the time of this note:    CT ABDOMEN PELVIS WO CONTRAST Additional Contrast? None   Final Result   Right hydronephrosis with right hydroureter and infiltration of fat   surrounding the right ureter. 1 mm punctate calcification projects in the   region of the distal right ureter. Otherwise no definite obstructing stone   identified. Although obstruction or recent passage of a stone is suspected   the infiltration of the fat surrounding the right ureter can be seen in   pyelonephritis.       Normal appearing gallbladder      Normal appearing appendix      Colonic diverticulosis without evidence for diverticulitis               LABS:  Labs Reviewed   COMPREHENSIVE METABOLIC PANEL W/ REFLEX TO MG FOR LOW K - Abnormal; Notable for the following components:       Result Value    Glucose 139 (*)     GFR Non- 55 (*)     All other components within normal limits    Narrative:     Performed at:  [facility],  [place], [facility]   Phone [phone]   LACTATE, SEPSIS - Abnormal; Notable for the following components:    Lactic Acid, Sepsis 2.2 (*)     All other components within normal limits    Narrative:     Performed at:  [facility],  [place], [facility]   Phone [phone]   URINALYSIS - Abnormal; Notable for the following components:    Blood, Urine SMALL (*)     All other components within normal limits    Narrative:     Performed at:  [facility],  [place], [facility]   Phone [phone]   MICROSCOPIC URINALYSIS - Abnormal; Notable for the following components:    Mucus, UA 3+ (*)     RBC, UA 11-20 (*)     Epithelial Cells, UA 11-20 (*)     All other components within normal limits    Narrative:     Performed at:  [facility],  [place], [facility]   Phone [phone] & INFLUENZA COMBO    Narrative:     Performed at:  [facility],  [place], [facility]   Phone [phone]   CULTURE, URINE   CBC WITH AUTO DIFFERENTIAL    Narrative:     Performed at:  [facility],  [place], [facility]   Phone [phone]   LACTATE, SEPSIS    Narrative:     Performed at:  [facility]) [facility],  [place], [facility]   Phone [phone]       All other labs were within normal range or not returned as of this dictation. EMERGENCY DEPARTMENT COURSE and DIFFERENTIAL DIAGNOSIS/MDM:   Micah Bernardo is a 74 y.o. female who presents to the emergency department with the complaint of sided flank pain radiation to right lower quadrant. Does have tenderness to palpation right lower quadrant on exam.  History of kidney stones feels similar obtain CT without contrast with concern of right flank pain being related to kidney stone type pain also evaluate for appendix. Will check basic labs, lactic acid based on age and symptoms. Will treat IV fluids, antiemetics, pain control. Patient difficult to control symptoms initially requiring multiple doses of pain control and antiemetics    Her initial lactic was elevated 2.2, trended down to 0.7 after IV fluids. While urine is not consistent with UTI with negative nitrite and leuk esterase with white blood cells 0-2, patient was treated with Cipro for concerns of pyelonephritis on CT imaging until urine culture results. Patient was found to have 1 mm stone nonobstructing at this time.   Plan is to mid to hospital service for symptom control, urology consult. CRITICAL CARE TIME   Total Critical Care time was 0 minutes, excluding separately reportable procedures. There was a high probability of clinically significant/life threatening deterioration in the patient's condition which required my urgent intervention. Clinical concern   Intervention     CONSULTS:  IP CONSULT TO HOSPITALIST  IP CONSULT TO UROLOGY    PROCEDURES:  Unless otherwise noted below, none     Procedures        FINAL IMPRESSION      1. Right flank pain    2. Kidney stone    3. Hydronephrosis, unspecified hydronephrosis type          DISPOSITION/PLAN   DISPOSITION    admit    PATIENT REFERRED TO:  No follow-up provider specified. DISCHARGE MEDICATIONS:  New Prescriptions    No medications on file     Controlled Substances Monitoring:     No flowsheet data found.     (Please note that portions of this note were completed with a voice recognition program.  Efforts were made to edit the dictations but occasionally words are mis-transcribed.)    Tim Chowdhury DO (electronically signed)  Attending Emergency Physician            Tim Chowdhury DO  05/23/21 1519

## 2021-05-23 NOTE — PROGRESS NOTES
Assessment Tool (BMAT):     Assessment Level 1- Sit and Shake    1. From a semi-reclined position, ask patient to sit up and rotate to a seated position at the side of the bed. Can use the bedrail. 2. Ask patient to reach out and grab your hand and shake making sure patient reaches across his/her midline. Pass- Patient is able to come to a seated position, maintain core strength. Maintains seated balance while reaching across midline. Move on to Assessment Level 2. Assessment Level 2- Stretch and Point   1. With patient in seated position at the side of the bed, have patient place both feet on the floor (or stool) with knees no higher than hips. 2. Ask patient to stretch one leg and straighten the knee, then bend the ankle/flex and point the toes. If appropriate, repeat with the other leg. Pass- Patient is able to demonstrate appropriate quad strength on intended weight bearing limb(s). Move onto Assessment Level 3. Assessment Level 3- Stand   1. Ask patient to elevate off the bed or chair (seated to standing) using an assistive device (cane, bedrail). 2. Patient should be able to raise buttocks off be and hold for a count of five. May repeat once. Pass- Patient maintains standing stability for at least 5 seconds, proceed to assessment level 4. Assessment Level 4- Walk   1. Ask patient to march in place at bedside. 2. Then ask patient to advance step and return each foot. Some medical conditions may render a patient from stepping backwards, use your best clinical judgement. Pass- Patient demonstrates balance while shifting weight and ability to step, takes independent steps, does not use assistive device patient is MOBILITY LEVEL 4. Mobility Level- 4  Independent selfer that is able to follow commands,.

## 2021-05-23 NOTE — ED NOTES
Report given to James GUTIERREZ at bedside. Patient transported to floor via wheelchair with belongings.      Rita Remy RN  05/23/21 0988

## 2021-05-23 NOTE — ED NOTES
Perfect serve message to Dr. [place] Blanchard [place] [facility] [facility] [address]  05/23/21 2923    Jenette Phalen returned the call to Dr Linnea Parsons  05/23/21

## 2021-05-23 NOTE — PROGRESS NOTES
/62   Pulse 71   Temp 97 °F (36.1 °C) (Oral)   Resp 18   Ht 5' 8\" (1.727 m)   Wt 220 lb (99.8 kg)   SpO2 92%   BMI 33.45 kg/m²      Assessment complete. Meds passed. Pt denies needs at this time    Pt in bed. Only complains of soreness instead of pain. Drinks provided. PCA notified of NPO after midnight status. Bedside Mobility Assessment Tool (BMAT):     Assessment Level 1- Sit and Shake    1. From a semi-reclined position, ask patient to sit up and rotate to a seated position at the side of the bed. Can use the bedrail. 2. Ask patient to reach out and grab your hand and shake making sure patient reaches across his/her midline. Pass- Patient is able to come to a seated position, maintain core strength. Maintains seated balance while reaching across midline. Move on to Assessment Level 2. Assessment Level 2- Stretch and Point   1. With patient in seated position at the side of the bed, have patient place both feet on the floor (or stool) with knees no higher than hips. 2. Ask patient to stretch one leg and straighten the knee, then bend the ankle/flex and point the toes. If appropriate, repeat with the other leg. Pass- Patient is able to demonstrate appropriate quad strength on intended weight bearing limb(s). Move onto Assessment Level 3. Assessment Level 3- Stand   1. Ask patient to elevate off the bed or chair (seated to standing) using an assistive device (cane, bedrail). 2. Patient should be able to raise buttocks off be and hold for a count of five. May repeat once. Pass- Patient maintains standing stability for at least 5 seconds, proceed to assessment level 4. Assessment Level 4- Walk   1. Ask patient to march in place at bedside. 2. Then ask patient to advance step and return each foot. Some medical conditions may render a patient from stepping backwards, use your best clinical judgement.    Pass- Patient demonstrates balance while shifting weight and ability to step, takes independent steps, does not use assistive device patient is MOBILITY LEVEL 4. Mobility Level- 4   Pt is an alert and oriented.  selfer

## 2021-05-23 NOTE — ED NOTES
Bed: 15  Expected date:   Expected time:   Means of arrival:   Comments:  Paolo Epstein 89  05/23/21 0932

## 2021-05-23 NOTE — H&P
Admission [address];  MRN: [id number] ;   Admit Date: 5/23/2021  9:32 AM   Current Date and Time: 5/23/2021 2:15 PM    PCP  --  No primary care provider on file. Chief Complaint   Patient presents with    Flank Pain     c/o right flank pain that radiates to abdomen. Patient has hx of uti, bladder infections and kidney stones. HPI:  Patient is a 74 y.o.  female  With known h.o  Hypothyroidism, recurrent UTI on macrobid  presents with increasing right flank pain for last few days  Hx of renal stones and pain feels the same with sharp pain radiating to right LQ and to groin . No fevers or chills. Has nausea but no vomiting    Pt has seen Dr. Sandy Horan in the past and had urethral dilatations   Workup showed right obstructive stone with hydronephrosis and hydroureter and admitted for eval     Allergies   Allergen Reactions    Keflex [Cephalexin]     Sulfa Antibiotics Hives    Percocet [Oxycodone-Acetaminophen] Nausea And Vomiting     Flu symptoms        Past Medical History:   Diagnosis Date    Abnormal vision     Arthritis     Asthma     Back ache     Circulation problem     Feet    Thyroid disease       Past Surgical History:   Procedure Laterality Date    ABDOMINAL WALL SURGERY      CYSTOSCOPY N/A 5/1/2019    CYSTOSCOPY, URETHRAL DILATATION performed by Cyndi Barker MD at [place] [address] Right 08/2018    HYSTERECTOMY        Not in a hospital admission.   Allergies   Allergen Reactions    Keflex [Cephalexin]     Sulfa Antibiotics Hives    Percocet [Oxycodone-Acetaminophen] Nausea And Vomiting     Flu symptoms       Social History     Tobacco Use    Smoking status: Never Smoker    Smokeless tobacco: Never Used   Substance Use Topics    Alcohol use: No      Family History   Problem Relation Age of Onset    Lung Cancer Brother     Other Brother         Bowel Disease and Heart     Other Brother         Passed while sleeping     Liver Disease Brother           Review of systems      Constitutional: Negative for fever or chills  HENT: Negative for sore throat   Eyes: Negative for redness   Respiratory: Negative  for dyspnea, cough   Cardiovascular: Negative for chest pain   Gastrointestinal:neg for abd pain, nausea or vomiting or diarrhea  No melena or BRPR  Genitourinary: Negative for hematuria + flank pain right side  Musculoskeletal: Negative for arthralgias   Skin: Negative for rash   Neurological: Negative for syncope   Hematological: Negative for adenopathy   Psychiatric/Behavorial: Negative for anxiety      Objective:     VS: Temp: 98 °F (36.7 °C)  Pulse: 73  BP: (!) 151/87  SpO2: 96 %        Physical Exam:  General:  Elderly female healthy appearing  Awake, alert and oriented. Appears to be not in any distress  Mucous Membranes:  Pink , anicteric  Neck: No JVD, no carotid bruit, no thyromegaly  Chest:  Clear to auscultation bilaterally, no added sounds  Cardiovascular:  RRR S1S2 heard, no murmurs or gallops  Abdomen:  Soft, undistended, right flank ++ tender, no organomegaly, BS present  Extremities: No edema or cyanosis.  Distal pulses well felt  Neurological : no focal deficits        LABS:  CBC:  Lab Results   Component Value Date    WBC 7.7 05/23/2021    HGB 16.0 05/23/2021    HCT 46.8 05/23/2021    MCV 91.8 05/23/2021     05/23/2021    NEUTOPHILPCT 55.9 05/23/2021    LYMPHOPCT 30.5 05/23/2021    MONOPCT 9.1 05/23/2021    BASOPCT 1.6 05/23/2021    NEUTROABS 4.3 05/23/2021    LYMPHSABS 2.3 05/23/2021    MONOSABS 0.7 05/23/2021    EOSABS 0.2 05/23/2021    BASOSABS 0.1 05/23/2021     BMP:   Lab Results   Component Value Date     05/23/2021    K 4.3 05/23/2021    CO2 22 05/23/2021    BUN 15 05/23/2021    CREATININE 1.0 05/23/2021    CALCIUM 9.6 05/23/2021     Coagulation: No results found for: INR, APTT  Cardiac markers: No results found for: CKMB, CKTOTAL, TROPONINI, MYOGLOBIN  ABGs: No results found for: POCPH, [place]    Lab Results   Component Value Date    ALT 25 05/23/2021    AST 28 05/23/2021    ALKPHOS 107 05/23/2021    BILITOT 0.7 05/23/2021         EKG: not done    Radiology:    Ct abd    Right hydronephrosis with right hydroureter and infiltration of fat   surrounding the right ureter.  1 mm punctate calcification projects in the   region of the distal right ureter.  Otherwise no definite obstructing stone   identified.  Although obstruction or recent passage of a stone is suspected   the infiltration of the fat surrounding the right ureter can be seen in   pyelonephritis. Normal appearing gallbladder     Normal appearing appendix     Colonic diverticulosis without evidence for diverticulitis       ASSESMENT & PLAN:     1. Right obstructive stone with hydronephrosis and hydroureter   - possible 1 mm stone in distal right ureter  - admit for pain control and urology eval  - start on IVF> morphine, abx       2.  Hypothyroid - on synthroid       Diet:npo   GI/DVT PX: /lovenox  CODE STATUS: full code     Jamal Clark MD, MD 5/23/2021 2:15 PM

## 2021-05-24 VITALS
WEIGHT: 220 LBS | DIASTOLIC BLOOD PRESSURE: 71 MMHG | RESPIRATION RATE: 14 BRPM | HEART RATE: 79 BPM | SYSTOLIC BLOOD PRESSURE: 116 MMHG | HEIGHT: 68 IN | BODY MASS INDEX: 33.34 KG/M2 | TEMPERATURE: 96.9 F | OXYGEN SATURATION: 94 %

## 2021-05-24 LAB
ANION GAP SERPL CALCULATED.3IONS-SCNC: 5 MMOL/L (ref 3–16)
BUN BLDV-MCNC: 14 MG/DL (ref 7–20)
CALCIUM SERPL-MCNC: 8.3 MG/DL (ref 8.3–10.6)
CHLORIDE BLD-SCNC: 114 MMOL/L (ref 99–110)
CO2: 23 MMOL/L (ref 21–32)
CREAT SERPL-MCNC: 0.9 MG/DL (ref 0.6–1.2)
EKG ATRIAL RATE: 65 BPM
EKG DIAGNOSIS: NORMAL
EKG P AXIS: 33 DEGREES
EKG P-R INTERVAL: 148 MS
EKG Q-T INTERVAL: 456 MS
EKG QRS DURATION: 72 MS
EKG QTC CALCULATION (BAZETT): 474 MS
EKG R AXIS: -4 DEGREES
EKG T AXIS: 36 DEGREES
EKG VENTRICULAR RATE: 65 BPM
GFR AFRICAN AMERICAN: >60
GFR NON-AFRICAN AMERICAN: >60
GLUCOSE BLD-MCNC: 91 MG/DL (ref 70–99)
HCT VFR BLD CALC: 38.9 % (ref 36–48)
HEMOGLOBIN: 13 G/DL (ref 12–16)
MCH RBC QN AUTO: 31.3 PG (ref 26–34)
MCHC RBC AUTO-ENTMCNC: 33.4 G/DL (ref 31–36)
MCV RBC AUTO: 93.6 FL (ref 80–100)
PDW BLD-RTO: 13.4 % (ref 12.4–15.4)
PLATELET # BLD: 218 K/UL (ref 135–450)
PMV BLD AUTO: 9.2 FL (ref 5–10.5)
POTASSIUM REFLEX MAGNESIUM: 4.2 MMOL/L (ref 3.5–5.1)
RBC # BLD: 4.15 M/UL (ref 4–5.2)
SODIUM BLD-SCNC: 142 MMOL/L (ref 136–145)
URINE CULTURE, ROUTINE: NORMAL
WBC # BLD: 6.1 K/UL (ref 4–11)

## 2021-05-24 PROCEDURE — 36415 COLL VENOUS BLD VENIPUNCTURE: CPT

## 2021-05-24 PROCEDURE — 99238 HOSP IP/OBS DSCHRG MGMT 30/<: CPT | Performed by: PHYSICIAN ASSISTANT

## 2021-05-24 PROCEDURE — 2580000003 HC RX 258: Performed by: PHYSICIAN ASSISTANT

## 2021-05-24 PROCEDURE — 93010 ELECTROCARDIOGRAM REPORT: CPT | Performed by: INTERNAL MEDICINE

## 2021-05-24 PROCEDURE — 82365 CALCULUS SPECTROSCOPY: CPT

## 2021-05-24 PROCEDURE — 88300 SURGICAL PATH GROSS: CPT

## 2021-05-24 PROCEDURE — 80048 BASIC METABOLIC PNL TOTAL CA: CPT

## 2021-05-24 PROCEDURE — 6360000002 HC RX W HCPCS: Performed by: PHYSICIAN ASSISTANT

## 2021-05-24 PROCEDURE — 85027 COMPLETE CBC AUTOMATED: CPT

## 2021-05-24 PROCEDURE — 6370000000 HC RX 637 (ALT 250 FOR IP): Performed by: PHYSICIAN ASSISTANT

## 2021-05-24 RX ORDER — HYDROCODONE BITARTRATE AND ACETAMINOPHEN 5; 325 MG/1; MG/1
1 TABLET ORAL 2 TIMES DAILY
Qty: 6 TABLET | Refills: 0 | Status: SHIPPED | OUTPATIENT
Start: 2021-05-24 | End: 2021-05-27

## 2021-05-24 RX ORDER — CIPROFLOXACIN 500 MG/1
500 TABLET, FILM COATED ORAL 2 TIMES DAILY
Qty: 10 TABLET | Refills: 0 | Status: SHIPPED | OUTPATIENT
Start: 2021-05-24 | End: 2021-05-29

## 2021-05-24 RX ORDER — NITROFURANTOIN 25; 75 MG/1; MG/1
50 CAPSULE ORAL DAILY
Qty: 30 CAPSULE | Refills: 0
Start: 2021-05-29

## 2021-05-24 RX ADMIN — Medication 10 ML: at 08:15

## 2021-05-24 RX ADMIN — SODIUM CHLORIDE: 9 INJECTION, SOLUTION INTRAVENOUS at 05:14

## 2021-05-24 RX ADMIN — LEVOTHYROXINE SODIUM 112 MCG: 112 TABLET ORAL at 05:14

## 2021-05-24 RX ADMIN — CIPROFLOXACIN 400 MG: 2 INJECTION, SOLUTION INTRAVENOUS at 02:15

## 2021-05-24 RX ADMIN — HYDROCODONE BITARTRATE AND ACETAMINOPHEN 1 TABLET: 5; 325 TABLET ORAL at 00:19

## 2021-05-24 ASSESSMENT — PAIN DESCRIPTION - LOCATION: LOCATION: GENERALIZED

## 2021-05-24 NOTE — PROGRESS NOTES
Care transferred from [facility] at this time. Pt awake denies any pain or needs.  Call light within reach

## 2021-05-24 NOTE — CARE COORDINATION
Case Management Assessment  Initial Evaluation/Discharge Summary       Patient Name: Sunshine Yan  YOB: 1955  Diagnosis: Hydronephrosis of right kidney [N13.30]  Date / Time: 5/23/2021  9:32 AM    Admission status/Date: 05/23/2021 Inpatient   Chart Reviewed: Yes      Patient Interviewed: Yes   Family Interviewed:  No      Hospitalization in the last 30 days:  no      Health Care Decision Maker :   Primary Decision Maker: [facility] - Child - [phone]    (CM - must 1st enter selection under Navigator - emergency contact- Health Care Decision Maker Relationship and pick relationship)   Who do you trust or have selected to make healthcare decisions for you      Met with: pt   Interview conducted  (bedside/phone): bedside    Current PCP: Daxa Ocampo CNP in [place] [address] required for SNF : N          3 night stay required -  N-WAIVED    ADLS  Support Systems/Care Needs: None  Transportation: self    Meal Preparation: self    Housing  Living Arrangements: pt lives at home alone  Steps: 2  Intent for return to present living arrangements: Yes  Identified Issues: [id number] [address] [facility] [address] : No Agency:(Services)  Type of Home Care Services: None  Passport/Waiver : No  :                      Phone Number:    Passport/Waiver Services: n/a          Durable Medical Equiptment   DME Provider: n/a  Equipment:   Walker___Cane___RTS___ BSC___Shower Chair___Hospital Bed___W/C____Other________  02 at ____Liter(s)---wears(frequency)_______ HHN ___ CPAP___ BiPap___   N/A__x__      Home O2 Use :  No    If No for home O2---if presently on O2 during hospitalization:  No  if yes CM to follow for potential DC O2 need  Informed of need for care provider to bring portable home O2 tank on day of discharge for nursing to connect prior to leaving:   Not Indicated  Verbalized agreement/Understanding:   Not Indicated    Community Service Affiliation  Dialysis:  No    · Agency:  · Location:  · Dialysis Schedule:  · Phone:   · Fax: Other Community Services: n/a    DISCHARGE PLAN: Explained Case Management role/services. Chart review completed. Met with pt at bedside. She is independent at home and plans on returning home. She does not anticipate needing [facility] for RN/PT/OT when writer inquired. She denied needs or questions for CM at this time. CM not following. Boris Gaviria RN aware of the above. Please notify CM if needs or concerns arise. Pt is being d/c'd to home today. Pt's O2 sats are 94% on Room Air per vitals in epic. Discharge timeout done with Boris Gaviria RN. All discharge needs and concerns addressed. Winston Christine

## 2021-05-24 NOTE — PROGRESS NOTES
Resting in bed awake. No complaints of pain or discomfort at present time. Pt wanting to eat. Explained to pt that MD states that stone is little and believes that she will be able to pass stone. Explained that it is ok to start diet if pt is having NO pain. Regular diet placed due to pt not having pain and pt hungry. Am assessment complete. Alert and oriented. Call light in reach. Patient is able to demonstrate the ability to move from a reclining position to an upright position within the recliner. Bedside Mobility Assessment Tool (BMAT):     Assessment Level 1- Sit and Shake    1. From a semi-reclined position, ask patient to sit up and rotate to a seated position at the side of the bed. Can use the bedrail. 2. Ask patient to reach out and grab your hand and shake making sure patient reaches across his/her midline. Pass- Patient is able to come to a seated position, maintain core strength. Maintains seated balance while reaching across midline. Move on to Assessment Level 2. Assessment Level 2- Stretch and Point   1. With patient in seated position at the side of the bed, have patient place both feet on the floor (or stool) with knees no higher than hips. 2. Ask patient to stretch one leg and straighten the knee, then bend the ankle/flex and point the toes. If appropriate, repeat with the other leg. Pass- Patient is able to demonstrate appropriate quad strength on intended weight bearing limb(s). Move onto Assessment Level 3. Assessment Level 3- Stand   1. Ask patient to elevate off the bed or chair (seated to standing) using an assistive device (cane, bedrail). 2. Patient should be able to raise buttocks off be and hold for a count of five. May repeat once. Pass- Patient maintains standing stability for at least 5 seconds, proceed to assessment level 4. Assessment Level 4- Walk   1. Ask patient to march in place at bedside.     2. Then ask patient to advance step and return each

## 2021-05-24 NOTE — ACP (ADVANCE CARE PLANNING)
Advance Care Planning   Healthcare Decision Maker:    Primary Decision Maker: [facility] - Child - [phone]    Click here to complete Healthcare Decision Makers including selection of the Healthcare Decision Maker Relationship (ie \"Primary\").

## 2021-05-24 NOTE — CONSULTS
Patient:  Binta Connelly  YOB: 1955   CSN:  [id number]    Referring Provider:  No ref. provider found   Primary Care Provider:  No primary care provider on file. Urology Attending Consult Note     Reason for Consultation:  nephrolithiasis, flank pain    Chief Complaint: \"I have a kidney stone\"    HPI:  Binta Connelly is a 74 y.o. female who presented with history of severe renal colic which prompted visit to the ER. The patient had some vague upper flank and back pain and then sudden severe discomfort, bladder pain and nausea. CT showed a 1mm stone in the right distal ureter, more details below. No fevers. The patient feels well now and prefers to go home. She just passed the stone.      Past Medical History:   Diagnosis Date    Abnormal vision     Arthritis     Asthma     Back ache     Circulation problem     Feet    Thyroid disease        Past Surgical History:   Procedure Laterality Date    ABDOMINAL WALL SURGERY      CYSTOSCOPY N/A 5/1/2019    CYSTOSCOPY, URETHRAL DILATATION performed by Matty Best MD at [place] [address] Right 08/2018    HYSTERECTOMY         Medication List reviewed:     Current Facility-Administered Medications   Medication Dose Route Frequency Provider Last Rate Last Admin    levothyroxine (SYNTHROID) tablet 112 mcg  112 mcg Oral Daily Omar Halsted, PA-C   112 mcg at 05/24/21 0514    0.9 % sodium chloride infusion   Intravenous Continuous Omar Halsted, PA-C 150 mL/hr at 05/24/21 0951 Rate Verify at 05/24/21 0951    sodium chloride flush 0.9 % injection 5-40 mL  5-40 mL Intravenous 2 times per day Omar Halsted, PA-C   10 mL at 05/24/21 0815    sodium chloride flush 0.9 % injection 5-40 mL  5-40 mL Intravenous PRN Omar Halsted, PA-C        0.9 % sodium chloride infusion  25 mL Intravenous PRN Omar Halsted, PA-C        enoxaparin (LOVENOX) injection 40 mg  40 mg Subcutaneous Daily Arvid Pitcher, PA-C   40 mg at 05/23/21 1700    acetaminophen (TYLENOL) tablet 650 mg  650 mg Oral Q4H PRN Arvid Pitcher, PA-C        promethazine (PHENERGAN) tablet 12.5 mg  12.5 mg Oral Q6H PRN Arvid Pitcher, PA-C        Or    ondansetron (ZOFRAN) injection 4 mg  4 mg Intravenous Q6H PRN Arvid Pitcher, PA-C        polyethylene glycol (GLYCOLAX) packet 17 g  17 g Oral Daily PRN Arvid Pitcher, PA-C        ciprofloxacin (CIPRO) IVPB 400 mg  400 mg Intravenous Q12H Arvid Pitcher, PA-C   Stopped at 05/24/21 0315    HYDROcodone-acetaminophen (NORCO) 5-325 MG per tablet 1 tablet  1 tablet Oral Q4H PRN Arvid Pitcher, PA-C   1 tablet at 05/24/21 0019    Or    HYDROcodone-acetaminophen (NORCO) 5-325 MG per tablet 2 tablet  2 tablet Oral Q4H PRN Arvid Pitcher, PA-C        morphine (PF) injection 2 mg  2 mg Intravenous Q2H PRN Arvid Pitcher, PA-C        Or    morphine sulfate (PF) injection 4 mg  4 mg Intravenous Q2H PRN Arvid Pitcher, PA-C           Allergies   Allergen Reactions    Keflex [Cephalexin]     Sulfa Antibiotics Hives    Percocet [Oxycodone-Acetaminophen] Nausea And Vomiting     Flu symptoms        Family History   Problem Relation Age of Onset    Lung Cancer Brother     Other Brother         Bowel Disease and Heart     Other Brother         Passed while sleeping     Liver Disease Brother        Social History     Tobacco Use    Smoking status: Never Smoker    Smokeless tobacco: Never Used   Vaping Use    Vaping Use: Never assessed   Substance Use Topics    Alcohol use: No    Drug use: No         Review of Systems: A 12 point ROS was performed and was unremarkable unless listed in the history of present illness. I/O last 3 completed shifts:   In: 1870.7 [I.V.:1870.7]  Out: 1150 [Urine:1150]    Physical Exam:  Patient Vitals for the past 8 hrs:   BP Temp Temp src Pulse Resp SpO2   05/24/21 0820 116/71 96.9 °F (36.1 °C) Oral 79 14 94 %     Constitutional: pleasant patient in NAD, with a normal body habitus   EENT: pink conjunctivae, lips without cyanosis, normal appearance of ears and nose  Neck: no masses or lesions, trachea midline   Respiratory: normal respiratory movements without distress   Cardiovascular: regular rate and rhythm, lower extremities without edema   Abdomen: soft, NTND, no masses, no guarding  Back: no CVAT, no abnormal curvature of the spine  Lymphatic: no palpable cervical or supraclavicular lymphadenopathy  Skin: warm and dry, no rashes, lesions, or ulcers  Musculoskeletal: normal ROM, m. tone, no digital cyanosis, head normocephalic  Psych: normal mood and affect, alert and appropriately answers questions  : stable bladder, no urethral catheter    Labs:     No results found for: PSA  No results found for: PSA, PSADIA  Recent Labs     05/24/21  0542 05/23/21  0940   WBC 6.1 7.7   HGB 13.0 16.0   HCT 38.9 46.8   MCV 93.6 91.8    307     No results found for: LABA1C  Lab Results   Component Value Date    LABMICR YES 05/23/2021    CREATININE 0.9 05/24/2021     Lab Results   Component Value Date     05/24/2021    K 4.2 05/24/2021     (H) 05/24/2021    CO2 23 05/24/2021    BUN 14 05/24/2021    CREATININE 0.9 05/24/2021    GLUCOSE 91 05/24/2021    CALCIUM 8.3 05/24/2021    PROT 7.1 05/23/2021    LABALBU 3.9 05/23/2021    BILITOT 0.7 05/23/2021    ALKPHOS 107 05/23/2021    AST 28 05/23/2021    ALT 25 05/23/2021    LABGLOM >60 05/24/2021    GFRAA >60 05/24/2021    AGRATIO 1.2 05/23/2021    GLOB 3.2 05/23/2021     Lab Results   Component Value Date    CREATININE 0.9 05/24/2021    CREATININE 1.0 05/23/2021       Lab Results   Component Value Date    COLORU Yellow 05/23/2021    NITRU Negative 05/23/2021    GLUCOSEU Negative 05/23/2021    KETUA Negative 05/23/2021    UROBILINOGEN 0.2 05/23/2021    BILIRUBINUR Negative 05/23/2021     Radiology:  \"Imaging was independently reviewed by myself and I agree with the radiology interpretation except as noted above\"  CT:   Right hydronephrosis with right hydroureter and infiltration of fat   surrounding the right ureter.  1 mm punctate calcification projects in the   region of the distal right ureter.  Otherwise no definite obstructing stone   identified.  Although obstruction or recent passage of a stone is suspected   the infiltration of the fat surrounding the right ureter can be seen in   pyelonephritis.       Normal appearing gallbladder       Normal appearing appendix       Colonic diverticulosis without evidence for diverticulitis         Assessment:  RIGHT hydronephrosis secondary to obstruction from ureteral calculi, as described above  Flank pain and nausea    Plan:   - Discussed the situation with the patient - she looks to have passed the stone - we are sending it for analysis, albeit it is very very small  - PRN toradol and narcotics for pain  - give some abx per primary  - ok to DC home-  Can see as outpatient for stone prevention discussion        Mary Jo Maldonado MD  [facility]  Office: [phone]

## 2021-05-24 NOTE — DISCHARGE SUMMARY
Name:  Wayne Zendejas  Room:  9566/5819-15  MRN:    [id number]    Discharge Summary      This discharge summary is in conjunction with a complete physical exam done on the day of discharge. Discharging Physician: Dr. Nat Cevalloshouse: 5/23/2021  Discharge: 5/24/2021     HPI taken from admission H&P:    Patient is a 74 y.o.  female  With known h.o  Hypothyroidism, recurrent UTI on macrobid  presents with increasing right flank pain for last few days  Hx of renal stones and pain feels the same with sharp pain radiating to right LQ and to groin . No fevers or chills. Has nausea but no vomiting     Pt has seen Dr. Karen Kay in the past and had urethral dilatations   Workup showed right obstructive stone with hydronephrosis and hydroureter and admitted for eval     Diagnoses this Admission and Hospital Course   Right obstructive stone with hydronephrosis and hydroureter   - possible 1 mm stone in distal right ureter-->passed this stone, sent to lab for eval  - Urology consult: no plans for OR, okay for d/c home, recommend Abx, and follow up OP with urology   - D/c home on Cipro, can resume home PPx Abx after completion   - start on IVF-->tolerating PO      Hypothyroid   - on synthroid     Procedures (Please Review Full Report for Details)  None    Consults    Urology     Physical Exam at Discharge:    /71   Pulse 79   Temp 96.9 °F (36.1 °C) (Oral)   Resp 14   Ht 5' 8\" (1.727 m)   Wt 220 lb (99.8 kg)   SpO2 94%   BMI 33.45 kg/m²     Gen: No distress. Alert. Eyes:  No conjunctival injection. ENT: No discharge. Pharynx clear. Neck: Trachea midline. Resp: No accessory muscle use. No crackles. No wheezes. No rhonchi. CV: Regular rate. Regular rhythm. No murmur. No rub. No edema. Capillary Refill: Brisk,< 3 seconds   Peripheral Pulses: +2 palpable, equal bilaterally   GI:+ TTP diffusely to abdomen. Non-distended. Normal bowel sounds. Skin: Warm and dry  M/S: No cyanosis.  No joint · how much to take  · These instructions start on May 29, 2021. If you are unsure what to do until then, ask your doctor or other care provider. CONTINUE taking these medications    celecoxib 200 MG capsule  Commonly known as: CELEBREX     levothyroxine 112 MCG tablet  Commonly known as: SYNTHROID           Where to Get Your Medications      You can get these medications from any pharmacy    Bring a paper prescription for each of these medications  · ciprofloxacin 500 MG tablet  · HYDROcodone-acetaminophen 5-325 MG per tablet     Information about where to get these medications is not yet available    Ask your nurse or doctor about these medications  · nitrofurantoin (macrocrystal-monohydrate) 100 MG capsule       Discharged in stable condition to home    Follow Up:   Follow up with PCP, urology OP

## 2021-05-24 NOTE — PROGRESS NOTES
Bedside report given to [facility]  pt in stable condition no needs at this time.  Call light within reach

## 2021-05-27 LAB
CALCULI COMPOSITION: NORMAL
MASS: 2 MG
STONE DESCRIPTION: NORMAL
STONE NUMBER: 1
STONE SIZE: NORMAL MM

## 2022-12-05 NOTE — PROGRESS NOTES
PRE OP INSTRUCTION SHEET   1. Do not eat or drink anything after 12 midnight  prior to surgery. This includes no water, chewing gum or mints. 2. Take the following pills will a small sip of water (see MAR)                                        3. Aspirin, Ibuprofen, Advil, Naproxen, Vitamin E, fish oil and other Anti-inflammatory products should be stopped for 5 days before surgery or as directed by your physician. 4. Check with your Doctor regarding stopping Plavix, Coumadin, Lovenox, Fragmin or other blood thinners   5. Do not smoke, and do not drink any alcoholic beverages 24 hours prior to surgery. This includes NA Beer. 6. You may brush your teeth and gargle the morning of surgery. DO NOT SWALLOW WATER   7. You MUST make arrangements for a responsible adult to take you home after your surgery. You will not be allowed to leave alone or drive yourself home. It is strongly suggested someone stay with you the first 24 hrs. Your surgery will be cancelled if you do not have a ride home. 8. A parent/legal guardian must accompany a child scheduled for surgery and plan to stay at the hospital until the child is discharged. Please do not bring other children with you. 9. Please wear simple, loose fitting clothing to the hospital.  [place] not bring valuables (money, credit cards, checkbooks, etc.) Do not wear any makeup (including no eye makeup) or nail polish on your fingers or toes. 10. DO NOT wear any jewelry or piercings on day of surgery. All body piercing jewelry must be removed. 11. If you have dentures,glasses, or contacts they will be removed before going to the OR; we will provide you a container. 12. Please see your family doctor/and cardiologist for a history & physical and/or concerning medications. Bring any test results/reports from your physician's office. Have history and labs faxed to [phone].  Remember to bring Blood Bank bracelet on the day of surgery. 14. If you have a Living Will and Durable Power of  for Healthcare, please bring in a copy. 13. Notify your Surgeon if you develop any illness between now and surgery  time, cough, cold, fever, sore throat, nausea, vomiting, etc.  Please notify your surgeon if you experience dizziness, shortness of breath or blurred vision between now & the time of your surgery   16. DO NOT shave your operative site 96 hours prior to surgery. For face & neck surgery, men may use an electric razor 48 hours prior to surgery. 17. Shower with _x__Antibacterial soap (x_chlorhexidine for total joint  Pt's) shower two times before surgery.(the morning of and the night before. 18. To provide excellent care visitors will be limited to one in the room at any given time.   Please call pre admission testing if you any further questions [phone] or [phone]

## 2022-12-08 ENCOUNTER — ANESTHESIA EVENT (OUTPATIENT)
Dept: OPERATING ROOM | Age: 67
End: 2022-12-08
Payer: MEDICARE

## 2022-12-09 ENCOUNTER — ANESTHESIA (OUTPATIENT)
Dept: OPERATING ROOM | Age: 67
End: 2022-12-09
Payer: MEDICARE

## 2022-12-09 ENCOUNTER — HOSPITAL ENCOUNTER (OUTPATIENT)
Age: 67
Setting detail: OUTPATIENT SURGERY
Discharge: HOME | End: 2022-12-09
Attending: OPHTHALMOLOGY | Admitting: OPHTHALMOLOGY
Payer: MEDICARE

## 2022-12-09 VITALS
HEIGHT: 67 IN | OXYGEN SATURATION: 97 % | TEMPERATURE: 97.3 F | WEIGHT: 204 LBS | RESPIRATION RATE: 20 BRPM | SYSTOLIC BLOOD PRESSURE: 136 MMHG | HEART RATE: 60 BPM | DIASTOLIC BLOOD PRESSURE: 99 MMHG | BODY MASS INDEX: 32.02 KG/M2

## 2022-12-09 PROCEDURE — 2500000003 HC RX 250 WO HCPCS: Performed by: OPHTHALMOLOGY

## 2022-12-09 PROCEDURE — 6370000000 HC RX 637 (ALT 250 FOR IP): Performed by: OPHTHALMOLOGY

## 2022-12-09 PROCEDURE — V2632 POST CHMBR INTRAOCULAR LENS: HCPCS | Performed by: OPHTHALMOLOGY

## 2022-12-09 PROCEDURE — 3700000000 HC ANESTHESIA ATTENDED CARE: Performed by: OPHTHALMOLOGY

## 2022-12-09 PROCEDURE — 6360000002 HC RX W HCPCS: Performed by: NURSE ANESTHETIST, CERTIFIED REGISTERED

## 2022-12-09 PROCEDURE — 7100000011 HC PHASE II RECOVERY - ADDTL 15 MIN: Performed by: OPHTHALMOLOGY

## 2022-12-09 PROCEDURE — 3600000003 HC SURGERY LEVEL 3 BASE: Performed by: OPHTHALMOLOGY

## 2022-12-09 PROCEDURE — 2580000003 HC RX 258: Performed by: NURSE ANESTHETIST, CERTIFIED REGISTERED

## 2022-12-09 PROCEDURE — 3600000013 HC SURGERY LEVEL 3 ADDTL 15MIN: Performed by: OPHTHALMOLOGY

## 2022-12-09 PROCEDURE — 3700000001 HC ADD 15 MINUTES (ANESTHESIA): Performed by: OPHTHALMOLOGY

## 2022-12-09 PROCEDURE — 6360000002 HC RX W HCPCS: Performed by: OPHTHALMOLOGY

## 2022-12-09 PROCEDURE — 2580000003 HC RX 258: Performed by: ANESTHESIOLOGY

## 2022-12-09 PROCEDURE — 7100000010 HC PHASE II RECOVERY - FIRST 15 MIN: Performed by: OPHTHALMOLOGY

## 2022-12-09 PROCEDURE — 2709999900 HC NON-CHARGEABLE SUPPLY: Performed by: OPHTHALMOLOGY

## 2022-12-09 DEVICE — IMPLANTABLE DEVICE: Type: IMPLANTABLE DEVICE | Site: EYE | Status: FUNCTIONAL

## 2022-12-09 RX ORDER — SODIUM CHLORIDE, SODIUM LACTATE, POTASSIUM CHLORIDE, CALCIUM CHLORIDE 600; 310; 30; 20 MG/100ML; MG/100ML; MG/100ML; MG/100ML
INJECTION, SOLUTION INTRAVENOUS CONTINUOUS PRN
Status: DISCONTINUED | OUTPATIENT
Start: 2022-12-09 | End: 2022-12-09 | Stop reason: SDUPTHER

## 2022-12-09 RX ORDER — TOBRAMYCIN AND DEXAMETHASONE 3; 1 MG/ML; MG/ML
1 SUSPENSION/ DROPS OPHTHALMIC CONTINUOUS
Status: DISCONTINUED | OUTPATIENT
Start: 2022-12-09 | End: 2022-12-09 | Stop reason: HOSPADM

## 2022-12-09 RX ORDER — SODIUM CHLORIDE, POTASSIUM CHLORIDE, CALCIUM CHLORIDE, MAGNESIUM CHLORIDE, SODIUM ACETATE, AND SODIUM CITRATE 6.4; .75; .48; .3; 3.9; 1.7 MG/ML; MG/ML; MG/ML; MG/ML; MG/ML; MG/ML
SOLUTION IRRIGATION PRN
Status: DISCONTINUED | OUTPATIENT
Start: 2022-12-09 | End: 2022-12-09 | Stop reason: ALTCHOICE

## 2022-12-09 RX ORDER — PREDNISOLONE ACETATE 10 MG/ML
SUSPENSION/ DROPS OPHTHALMIC PRN
Status: DISCONTINUED | OUTPATIENT
Start: 2022-12-09 | End: 2022-12-09 | Stop reason: ALTCHOICE

## 2022-12-09 RX ORDER — TOBRAMYCIN AND DEXAMETHASONE 3; 1 MG/ML; MG/ML
SUSPENSION/ DROPS OPHTHALMIC PRN
Status: DISCONTINUED | OUTPATIENT
Start: 2022-12-09 | End: 2022-12-09 | Stop reason: ALTCHOICE

## 2022-12-09 RX ORDER — SODIUM CHLORIDE 0.9 % (FLUSH) 0.9 %
5-40 SYRINGE (ML) INJECTION PRN
Status: DISCONTINUED | OUTPATIENT
Start: 2022-12-09 | End: 2022-12-09 | Stop reason: HOSPADM

## 2022-12-09 RX ORDER — PROPOFOL 10 MG/ML
INJECTION, EMULSION INTRAVENOUS PRN
Status: DISCONTINUED | OUTPATIENT
Start: 2022-12-09 | End: 2022-12-09 | Stop reason: SDUPTHER

## 2022-12-09 RX ORDER — SODIUM CHLORIDE, SODIUM LACTATE, POTASSIUM CHLORIDE, CALCIUM CHLORIDE 600; 310; 30; 20 MG/100ML; MG/100ML; MG/100ML; MG/100ML
INJECTION, SOLUTION INTRAVENOUS CONTINUOUS
Status: DISCONTINUED | OUTPATIENT
Start: 2022-12-09 | End: 2022-12-09 | Stop reason: HOSPADM

## 2022-12-09 RX ORDER — SODIUM CHLORIDE 9 MG/ML
INJECTION, SOLUTION INTRAVENOUS PRN
Status: DISCONTINUED | OUTPATIENT
Start: 2022-12-09 | End: 2022-12-09 | Stop reason: HOSPADM

## 2022-12-09 RX ORDER — PREDNISOLONE ACETATE 10 MG/ML
1 SUSPENSION/ DROPS OPHTHALMIC CONTINUOUS
Status: DISCONTINUED | OUTPATIENT
Start: 2022-12-09 | End: 2022-12-09 | Stop reason: HOSPADM

## 2022-12-09 RX ORDER — PILOCARPINE HYDROCHLORIDE 20 MG/ML
SOLUTION/ DROPS OPHTHALMIC PRN
Status: DISCONTINUED | OUTPATIENT
Start: 2022-12-09 | End: 2022-12-09 | Stop reason: ALTCHOICE

## 2022-12-09 RX ORDER — TETRACAINE HYDROCHLORIDE 5 MG/ML
SOLUTION OPHTHALMIC PRN
Status: DISCONTINUED | OUTPATIENT
Start: 2022-12-09 | End: 2022-12-09 | Stop reason: ALTCHOICE

## 2022-12-09 RX ORDER — SODIUM CHLORIDE 0.9 % (FLUSH) 0.9 %
5-40 SYRINGE (ML) INJECTION EVERY 12 HOURS SCHEDULED
Status: DISCONTINUED | OUTPATIENT
Start: 2022-12-09 | End: 2022-12-09 | Stop reason: HOSPADM

## 2022-12-09 RX ORDER — LIDOCAINE HYDROCHLORIDE 10 MG/ML
1 INJECTION, SOLUTION EPIDURAL; INFILTRATION; INTRACAUDAL; PERINEURAL
Status: DISCONTINUED | OUTPATIENT
Start: 2022-12-09 | End: 2022-12-09 | Stop reason: HOSPADM

## 2022-12-09 RX ADMIN — Medication 0.3 ML: at 07:48

## 2022-12-09 RX ADMIN — Medication 0.3 ML: at 07:30

## 2022-12-09 RX ADMIN — BROMFENAC SODIUM 1 DROP: 0.7 SOLUTION/ DROPS OPHTHALMIC at 07:29

## 2022-12-09 RX ADMIN — SODIUM CHLORIDE, POTASSIUM CHLORIDE, SODIUM LACTATE AND CALCIUM CHLORIDE: 600; 310; 30; 20 INJECTION, SOLUTION INTRAVENOUS at 07:45

## 2022-12-09 RX ADMIN — SODIUM CHLORIDE, POTASSIUM CHLORIDE, SODIUM LACTATE AND CALCIUM CHLORIDE: 600; 310; 30; 20 INJECTION, SOLUTION INTRAVENOUS at 09:15

## 2022-12-09 RX ADMIN — PROPOFOL 80 MG: 10 INJECTION, EMULSION INTRAVENOUS at 09:18

## 2022-12-09 RX ADMIN — Medication 0.3 ML: at 07:40

## 2022-12-09 RX ADMIN — TOBRAMYCIN AND DEXAMETHASONE 1 DROP: 3; 1 SUSPENSION/ DROPS OPHTHALMIC at 10:20

## 2022-12-09 ASSESSMENT — PAIN - FUNCTIONAL ASSESSMENT: PAIN_FUNCTIONAL_ASSESSMENT: 0-10

## 2022-12-09 ASSESSMENT — PAIN SCALES - GENERAL
PAINLEVEL_OUTOF10: 0
PAINLEVEL_OUTOF10: 0

## 2022-12-09 ASSESSMENT — PAIN DESCRIPTION - DESCRIPTORS: DESCRIPTORS: ACHING

## 2022-12-09 NOTE — H&P
Surgical Service History and Physical    CHIEF COMPLAINT:   Decreased Vision and Glare    Reason for Admission:  Cataract Surgery    History Obtained From:  Patient    HISTORY OF PRESENT ILLNESS:  Pt has noticed painless progressive loss of vision and is experiencing functional difficulty. Past Medical History:        Diagnosis Date    Abnormal vision     Arthritis     Asthma     Back ache     Circulation problem     Feet    Hiatal hernia     Hypertension     Prolonged emergence from general anesthesia     Thyroid disease        Past Surgical History:        Procedure Laterality Date    ABDOMINAL WALL SURGERY      CYSTOSCOPY N/A 05/01/2019    CYSTOSCOPY, URETHRAL DILATATION performed by Andreas Terrazas MD at [address] Right 08/2018    HYSTERECTOMY (CERVIX STATUS UNKNOWN)      JOINT REPLACEMENT         Allergies:  Adhesive tape, Augmentin [amoxicillin-pot clavulanate], Keflex [cephalexin], Sulfa antibiotics, and Percocet [oxycodone-acetaminophen]    Prior to Admission medications    Medication Sig Start Date End Date Taking?  Authorizing Provider   omeprazole (PRILOSEC) 20 MG delayed release capsule Take 40 mg by mouth Daily    Historical Provider, MD   rosuvastatin (CRESTOR) 5 MG tablet Take 5 mg by mouth daily    Historical Provider, MD   nitrofurantoin, macrocrystal-monohydrate, (MACROBID) 100 MG capsule Take 1 capsule by mouth daily  Patient taking differently: Take 50 mg by mouth nightly 5/29/21   MELISSA Blanco   celecoxib (CELEBREX) 200 MG capsule Take 200 mg by mouth nightly 11/8/17   Historical Provider, MD   levothyroxine (SYNTHROID) 112 MCG tablet Take 100 mcg by mouth Daily 11/8/17   Historical Provider, MD         REVIEW OF SYSTEMS:    CONSTITUTIONAL:  negative  EYES: negative  HEENT:  negative  RESPIRATORY:  negative  CARDIOVASCULAR:  negative  MUSCULOSKELETAL:  negative  NEUROLOGICAL:  negative    PHYSICAL EXAM:    VITALS:  BP (!) 134/91   Pulse 77   Temp 98 °F (36.7 °C) (Temporal)   Resp 16   Ht 5' 6.5\" (1.689 m)   Wt 204 lb (92.5 kg)   SpO2 94%   BMI 32.43 kg/m²   TEMPERATURE:  Current - Temp: 98 °F (36.7 °C); Max - Temp  Av °F (36.7 °C)  Min: 98 °F (36.7 °C)  Max: 98 °F (36.7 °C)  RESPIRATIONS RANGE: Resp  Av  Min: 16  Max: 16  PULSE RANGE: Pulse  Av  Min: 77  Max: 77  BLOOD PRESSURE RANGE:  Systolic (28WRV), PWF:820 , Min:134 , NOT:058   ; Diastolic (70KBS), BM, Min:91, Max:91    CONSTITUTIONAL:  awake, alert, cooperative, no apparent distress, and appears stated age  EYES:  Lids and lashes normal, pupils equal, round and reactive to light, extra ocular muscles intact, sclera clear, conjunctiva normal  ENT:  Normocephalic, without obvious abnormality, atramatic, sinuses nontender on palpation, external ears without lesions, oral pharynx with moist mucus membranes, tonsils without erythema or exudates, gums normal and good dentition. NECK:  Supple, symmetrical, trachea midline, no adenopathy, thyroid symmetric, not enlarged and no tenderness, skin normal  LUNGS:  No increased work of breathing, good air exchange, clear to auscultation bilaterally, no crackles or wheezing  CARDIOVASCULAR:  Normal apical impulse, regular rate and rhythm, normal S1 and S2, no S3 or S4, and no murmur noted  ABDOMEN:  No scars, normal bowel sounds, soft, non-distended, non-tender, no masses palpated, no hepatosplenomegally  MUSCULOSKELETAL:  There is no redness, warmth, or swelling of the joints. Full range of motion noted. Motor strength is 5 out of 5 all extremities bilaterally. Tone is normal.  NEUROLOGIC:  Awake, alert, oriented to name, place and time. Cranial nerves II-XII are grossly intact. Motor is 5 out of 5 bilaterally. Cerebellar finger to nose, heel to shin intact. Sensory is intact.   Babinski down going, Romberg negative, and gait is normal.      Impression: Visually Significant Cataract    Plan: Cataract Chantale Peter MD

## 2022-12-09 NOTE — DISCHARGE INSTRUCTIONS
General Instructions After Surgery  (For Three days)    Tracy Judd M.D. Office [phone]  Office  [phone]    1. Tobradex eye drops: one drop every two hours while awake start at __12 noon________       Continue one drop every two hours until seen in office at ___________    2. Bring all eye medications to the office with you today/tomorrow. 3. It is important to remember not to bend your head below your waist (you can squat or bend down on one knee, keeping your head upright.)    4. DO NOT LIFT objects heavier than 10 pounds, which is equal to a gallon of milk. 5. You can wash your face, but avoid the area around your operated eye. We recommend a partial bath today and tomorrow. When you resume normal bathing, be sure to turn your back in the shower avoiding water and shampoo from running into your operated eye. Avoid washing your hair for the first three days. 6. Lie on the un operated side while sleeping, or on your back. 7. PROTECT YOUR OPERATED EYE Wear your shield at bedtime at all times. During the day be sure to wear your glasses (or sunglasses provided) if you leave home. Remember your glasses will make your vision blurry in your operated eye, but will not in anyway damage your eye.    8. NEVER RUB OR PUSH ON YOUR EYE This is the worst thing that you can do while it is healing. 9. It is normal for the white part of the eye to appear red or bloodshot. DO NOT WORRY, this will clear in several weeks. 10. You should have NO PAIN after surgery. The eye may feel slightly irritated at first and (2) Tylenol may be used. If you have actual pain, increasing discomfort or sudden decrease in vision call us immediately. 11. Temporary \"floaters\" are not uncommon immediately after surgery, these will disappear. Your vision should gradually improve. Notify us immediately if you notice a shower of new floaters or flashes develop.                ANESTHESIA DISCHARGE INSTRUCTIONS    You are under the influence of drugs- do not drink alcohol, drive a car, operate machinery(such as power tools, kitchen appliances, etc), sign legal documents, or make any important decisions for 24 hours (or while on pain medications). Children should not ride bikes or [place] or play on gym sets  for 24 hours after surgery. A responsible adult should be with you for 24 hours. Rest at home today- increase activity as tolerated. Progress slowly to a regular diet unless your physician has instructed you otherwise. Drink plenty of water. CALL YOUR DOCTOR IF YOU:  Have moderate to severe nausea or vomiting AND are unable to hold down fluids or prescribed medications. Have bright red bloody drainage from your dressing that won't stop oozing. Do not get relief with your pain medication    NORMAL (POSSIBLE) SIDE EFFECTS FROM ANESTHESIA:     Confusion, temporary memory loss, delayed reaction times in the first 24 hours  Lightheadedness, dizziness, difficulty focusing, blurred vision  Nausea/vomiting can happen  Shivering, feeling cold, sore throat, cough and muscle aches should stop within 24-48 hours  Trouble urinating - call your surgeon if it has been more than 8 hrs  Bruising or soreness at the IV site - call if it remains red, firm or there is drainage             FEMALES OF CHILDBEARING AGE WHO ARE TAKING BIRTH CONTROL PILLS:  You may have received a medication during your procedure that interferes with the   actions of birth control pills (Bridion or Emend). Use some other kind of birth control in addition to your pills, like a condom, for 1 month after your procedure to prevent unwanted pregnancy. The following instructions are to be followed if you have a known history or diagnosis of sleep apnea: For all sleep apnea patients:  ? Sleep on your side or sitting up in a chair whenever possible, especially the first 24 hours after surgery. ? Use only medicines prescribed by your doctor.     ? Do not drink alcohol. ? If you have a dental device to assist you while at rest, use it at all times for the first 24 hours. For patients using CPAP machines:  ? Use your CPAP machine during all periods of sleep as usual.  ? Use your CPAP machine during all periods of daytime rest while on pain medicines. ** Follow up with your primary care doctor for continued care. IF YOU DO NOT TAKE ALL OF YOUR NARCOTIC PAIN MEDICATION, please dispose of them responsibly. There are drop off boxes in the Emergency Departments 24/7 at both [facility] and [facility]. If these locations are not convenient, other options for discarding them can be found at:  [email or web address]    Hospital or office staff may NOT accept any medications to drop off in the cabinet for you.

## 2022-12-09 NOTE — ANESTHESIA POSTPROCEDURE EVALUATION
Department of Anesthesiology  Postprocedure Note    Patient: Yohannes Perkins  MRN: [id number]  YOB: 1955  Date of evaluation: 12/9/2022      Procedure Summary     Date: 12/09/22 Room / Location: [place] [address] / [facility]    Anesthesia Start: 0915 Anesthesia Stop: 6988    Procedure: PHACO EMULSIFICATION OF CATARACT WITH INTRAOCULAR LENS IMPLANT EYE (Left: Eye) Diagnosis:       Nuclear sclerotic cataract of left eye      (Nuclear sclerotic cataract of left eye [H25.12])    Surgeons: Nini Mays MD Responsible Provider: Barry Moya MD    Anesthesia Type: MAC ASA Status: 3          Anesthesia Type: No value filed.     Reyes Phase I: Reyes Score: 10    Reyes Phase II: Reyes Score: 10      Anesthesia Post Evaluation    Patient location during evaluation: PACU  Level of consciousness: awake  Airway patency: patent  Nausea & Vomiting: no nausea  Complications: no  Cardiovascular status: blood pressure returned to baseline  Respiratory status: acceptable  Hydration status: euvolemic

## 2022-12-09 NOTE — PROGRESS NOTES
Arrived from OR with eye shield to left eye. HOB elevated and patient given soda to sip.   No c/o pain

## 2022-12-09 NOTE — OP NOTE
OPERATIVE NOTE    Patient Name   Date of Birth Age  MRN#  Magdaline Srinivas   1955   69 y.o.  [id number]       Surgeon        Surgery Date  Eugenia Duong MD        12/9/2022       Preoperative Diagnosis: Nuclear Sclerotic Cataract left eye    Postoperative Diagnosis: Nuclear Sclerotic Cataract left eye    Operative Procedure: Phacoemulsification/ Posterior Chamber Intraocular Lens Implantation          Anesthesia:   Peribulbar Block with MAC    Details of Procedure: The patient was brought to the operating room on the operative stretcher in the supine position with the head resting in the head rest.  After appropriate anesthesia monitoring devices were applied, IV sedation was carried out per the anesthesia service. Once sedation was achieved, a peribulbar block was performed, using a 5 mL combination solution containing 4 mL of 2% lidocaine with 1 mL of Vitrase. Approximately 2-3 mL of this solution was administered in a peribulbar fashion through the lower lid of the operative eye. Once appropriate akinesia and anesthesia were demonstrated, the operative eye was prepped and draped in the usual sterile fashion. Tobradex drops and Tetracain drops were administered. A wire lid speculum was then inserted into the operative eye. A paracentesis was then performed using a 15 degree supersharp blade to enter the globe at the limbus, and a .12 mm colibri forceps was used to stabilize the globe. Viscoat was then instilled into the anterior chamber. A temporal clear cornea groove was then created using the 15 degree supersharp blade. The cornea was then entered using the Jeffery 2.4 mm clearcut keratome blade. The capsulotomy was then performed using a cystotome, and the capsulorrhexis was completed using uttrata forceps. The nucleus of the cataract was then hydrodissected and hydrodelineated using sterile BSS on a 27 gauge cannula.   The nucleus of the cataract was then removed using the phacoemilsification/aspiration device. This was performed in a bimanual/CHOP technique. The remaining cortex was then removed using the irrigation/aspiration device. The posterior capsule was polished using the I/A device with CAP/VAC settings. The capsular bag was reformed using Provisc. The previously selected Jeffery Acrysof +11.0 diopter SA60AT intraocular lens implant was then inserted using the cartridge system. It was spun in place using a Kuglen hook. It was centered and found to be in good, stable position. The remaining viscoelastic was then removed using the I/A device. The corneal incision was then hydrated using sterile BSS on a 30 gauge cannula. It was checked and found to be water-tight. Pilocarpine 2%, followed by Tobradex ophthalmic solutions were then instilled into the operative eye. The wire lid speculum was removed, and a postoperative protective shield was applied. The patient was then transferred to the postanesthesia recovery unit in good stable condition. The patient tolerated the procedure well without complications. A postoperative instruction sheet was given, and the patient will follow up with Dr. Zac Goins office as scheduled.       EBL: none    Specimen: none Operative Note      Patient: Hari Burris  YOB: 1955  MRN: [id number]    Date of Procedure: 12/9/2022    Pre-Op Diagnosis: Nuclear sclerotic cataract of left eye [H25.12]    Post-Op Diagnosis: Same       Procedure(s):  PHACO EMULSIFICATION OF CATARACT WITH INTRAOCULAR LENS IMPLANT EYE    Surgeon(s):  Sohail Dior MD    Assistant:   * No surgical staff found *    Anesthesia: Monitor Anesthesia Care    Estimated Blood Loss (mL):none    Complications: None    Specimens:   * No specimens in log *    Implants:  * No implants in log *      Drains: * No LDAs found *    Findings:     Detailed Description of Procedure:       Electronically signed by Sohail Dior MD on 12/9/2022 at 9:17 AM

## 2022-12-09 NOTE — PROGRESS NOTES
Verbal and written discharge instructions given to patient and daughter. Verbalized understanding.   Patient getting dressed

## (undated) DEVICE — BOWL MED L 32OZ PLAS W/ MOLD GRAD EZ OPN PEEL PCH

## (undated) DEVICE — CANNULA NSL 13FT TUBE AD ETCO2 DIV SAMP M

## (undated) DEVICE — Device

## (undated) DEVICE — GOWN SIRUS NONREIN LG W/TWL: Brand: MEDLINE INDUSTRIES, INC.

## (undated) DEVICE — 6 ML SYRINGE LUER-LOCK TIP: Brand: MONOJECT

## (undated) DEVICE — CYSTO/BLADDER IRRIGATION SET, REGULATING CLAMP

## (undated) DEVICE — MEDI-VAC NON-CONDUCTIVE SUCTION TUBING: Brand: CARDINAL HEALTH

## (undated) DEVICE — PREP SOL PVP IODINE 4%  4 OZ/BTL

## (undated) DEVICE — NEEDLE HYPO 25GA L1.5IN BLU POLYPR HUB S STL REG BVL STR

## (undated) DEVICE — GLOVE ORANGE PI 8   MSG9080

## (undated) DEVICE — SOLUTION IV IRRIG WATER 1000ML POUR BRL 2F7114

## (undated) DEVICE — SOLUTION IV IRRIG 500ML 0.9% SODIUM CHL 2F7123

## (undated) DEVICE — SURGICAL PROCEDURE PACK EYE CLERMONT

## (undated) DEVICE — GAUZE,SPONGE,4"X4",8PLY,STRL,LF,10/TRAY: Brand: MEDLINE

## (undated) DEVICE — DBD-PACK,CYSTOSCOPY,PK VI,AURORA: Brand: MEDLINE

## (undated) DEVICE — BAG URIN STRL FOR URO CTCH SYS

## (undated) DEVICE — Z DUP USE 2522782 SOLUTION IRRIG 1000ML STRL H2O PLAS CONTAINER UROMATIC